# Patient Record
Sex: MALE | Race: BLACK OR AFRICAN AMERICAN | Employment: OTHER | URBAN - METROPOLITAN AREA
[De-identification: names, ages, dates, MRNs, and addresses within clinical notes are randomized per-mention and may not be internally consistent; named-entity substitution may affect disease eponyms.]

---

## 2019-10-25 ENCOUNTER — HOSPITAL ENCOUNTER (OUTPATIENT)
Dept: RADIATION THERAPY | Age: 64
Discharge: HOME | End: 2019-10-25

## 2020-01-03 ENCOUNTER — HOSPITAL ENCOUNTER (OUTPATIENT)
Dept: RADIATION THERAPY | Age: 65
Discharge: HOME | End: 2020-01-03

## 2020-02-14 ENCOUNTER — OFFICE VISIT (OUTPATIENT)
Dept: ONCOLOGY | Age: 65
End: 2020-02-14

## 2020-02-14 ENCOUNTER — DOCUMENTATION ONLY (OUTPATIENT)
Dept: ONCOLOGY | Age: 65
End: 2020-02-14

## 2020-02-14 VITALS
HEIGHT: 69 IN | DIASTOLIC BLOOD PRESSURE: 99 MMHG | HEART RATE: 108 BPM | WEIGHT: 191.2 LBS | BODY MASS INDEX: 28.32 KG/M2 | SYSTOLIC BLOOD PRESSURE: 147 MMHG | OXYGEN SATURATION: 97 % | TEMPERATURE: 98.2 F | RESPIRATION RATE: 18 BRPM

## 2020-02-14 DIAGNOSIS — C61 PRIMARY PROSTATE ADENOCARCINOMA (HCC): Primary | ICD-10-CM

## 2020-02-14 RX ORDER — METOPROLOL TARTRATE 25 MG/1
25 TABLET, FILM COATED ORAL 2 TIMES DAILY
COMMUNITY
End: 2020-10-06

## 2020-02-14 NOTE — PROGRESS NOTES
[facility] is a 62 y.o. male    Chief Complaint   Patient presents with    New Patient    Cancer     prostate cancer     1. Have you been to the ER, urgent care clinic since your last visit? Hospitalized since your last visit? No    2. Have you seen or consulted any other health care providers outside of the [address] [place] [address] since your last visit? Include any pap smears or colon screening.   Ana Maria Tesfaye in [facility] - Dr. Zenaida Szymanski; Dr. Bobbi Dejesus - prostate; Dr. Frank Dent - Dr. Bobbi Dejesus recommended Dr. Vivi Stewart - prostate    Dr. Frank Dent recommended that he come see Dr. Alana Monreal due to his prostate

## 2020-02-14 NOTE — PROGRESS NOTES
Oncology Navigator  Psychosocial Assessment    Reason for Assessment:    []Depression  []Anxiety  []Caregiver [place]  []Maladaptive Coping with Serious Illness   [] Social Work Referral [x] Initial Assessment  [] Other     Sources of Information:    [x]Patient  []Family  [x]Staff  [x]Medical Record    Advance Care Planning:  No flowsheet data found.     Mental Status:    [x]Alert  []Lethargic  []Unresponsive   [] Unable to assess   Oriented to:  [x]Person  [x]Place  [x]Time  [x]Situation      Barriers to Learning:    []Language  []Developmental  []Cognitive  []Altered Mental Status  []Visual/Hearing Impairment  []Unable to Read/Write  []Motivational   [x]No Barriers Identified  []Other:    Relationship Status:  []Single  []  []Significant Other/Life Partner  []  [x]  []      Living Circumstances:  [x]Lives Alone  []Family/Significant Other in Household  []Roommates  []Children in the Home  []Paid Caregivers  []Assisted Living Facility/Group Home  []Skilled [address]  []Homeless  []Incarcerated  []Environmental/Care Concerns  []other:    Employment Status:  []Employed Full-time []Employed Part-time []Homemaker [x] Disabled  [] Retired []Other:    Support System:    []Strong  []Fair  [x]Limited    Financial/Legal Concerns:    []Uninsured  [x]Limited Income/Resources  []Non-Citizen  []No Concerns Identified  []Financial POA:    []Other:    Baptism/Spiritual/Existential:  []Strong Sense of Spirituality  []Involved in [employer]  []Request  Visit  []Expressing Trung Alberto  [x]No Concerns Identified    Coping with Illness:         Patient: Family/Caregiver:   Understanding and Acceptance of Illness/Prognosis  [x] []   Strong Sense of Resilience [] []   Self Reflection [] []   Omar [] []   Does not Readily Discuss Illness [] []   Denial of Terminal Status [] []   Anger [] []   Depression [x] []   Anxiety/Fear [x] []   Bargaining [x] [] Recent Diagnosis/Prognosis [x] []   Difficulties with Body Image [] []   Loss of Identity [] []   Excessive Substance Use [] []   Mental Health History [] []   Enmeshed Relationships [] []   History of Loss [] []   Anticipatory Grief [] []   Concern for Complicated Grief [] []   Suicidal Ideation or Plan [] []   Unable to assess [] []            Narrative:   PT  for 7-8 years. PT has 4 children from 1st wife, 2 dtrs, 2 sons. One dtr lives in [place] and rest in [facility]. Pt has mortgage. Pt has social security $[id number]. Pt disease is curable. NEIL completed Medicaid application, called [facility] and scanned Medicaid appl for [facility] to submit to their on premise [address] services. NEIL provided pt with copy to take to  himself and a copy for when he goes to [facility]. Assessment/Action:     1. Introduce self and role of the  in the [facility]. 2. Informed the patient of the [facility] and available resources there. 3. Continue to meet with the patient when he returns to the clinic for ongoing assessment of the patient's adjustment to his  diagnosis and treatment. 4. Ongoing psychosocial support as desired by patient. Plan/Referral:      Complementary therapies referral  Insurance/Entitlements referral  No insurance, Possible [place] with a spend down    Financial/Medication assistance referral  Care card appl - pt will need to provide proof of income - Soc security letter. NEIL has in pending file      Baudilio Barroso.  ADALBERTO Chaney/TAYLER  Supervisee in Social Work

## 2020-02-23 NOTE — PROGRESS NOTES
[facility]  at [address] [place] [address], [address] [facility]  [phone]      Oncology Consultation Note         Patient: Tereza Mata MRN: [id number]  SSN: [id number]    YOB: 1955  Age: 62 y.o. Sex: male      Subjective:      Tereza Mata is a 62 y.o. male who I am seeing in consultation for a diagnosis of high risk localized prostate carcinoma. He was noted to have an elevated PSA of 215 in May 2019 and was seen by [facility]. A biopsy of the prostate revealed 1/12 core with [place] 4+3 and 11/12 cores with Alicia 3+4 prostate adenocarcinoma. He was seen by Dr. Luigi Busch and recommended to undergo EBRT with brachytherapy. He started taking Bicalutamide but has not been able to start Lupron due to cost and insurance issues. He was referred to Dr. Lilly Dumont who in turn sent the patient to me to start Lupron. He feels well and denies bone pains or weight loss. Imaging reveals no distant disease. Review of Systems:    Constitutional: negative  Eyes: negative  Ears, Nose, Mouth, Throat, and Face: negative  Respiratory: negative  Cardiovascular: negative  Gastrointestinal: negative  Genitourinary:negative  Integument/Breast: negative  Hematologic/Lymphatic: negative  Musculoskeletal:negative  Neurological: negative    History reviewed. No pertinent past medical history. History reviewed. No pertinent surgical history. Family History   Problem Relation Age of Onset    Heart Disease Brother         / had AICD     Social History     Tobacco Use    Smoking status: Current Every Day Smoker    Smokeless tobacco: Never Used    Tobacco comment:  day   Substance Use Topics    Alcohol use: Yes     Comment: beer/day - [place] Mist      Prior to Admission medications    Medication Sig Start Date End Date Taking?  Authorizing Provider   metoprolol tartrate (LOPRESSOR) 25 mg tablet Take 25 mg by mouth two (2) times a day. Yes Provider, Historical   aspirin 81 mg tablet Take 81 mg by mouth. Provider, Historical              No Known Allergies        Objective:     Vitals:    02/14/20 1459   BP: (!) 147/99   Pulse: (!) 108   Resp: 18   Temp: 98.2 °F (36.8 °C)   TempSrc: Oral   SpO2: 97%   Weight: 191 lb 3.2 oz (86.7 kg)   Height: 5' 9\" (1.753 m)            Physical Exam:    GENERAL: alert, cooperative, no distress, appears stated age  EYE: conjunctivae/corneas clear. PERRL, EOM's intact  LYMPHATIC: Cervical, supraclavicular, and axillary nodes normal.   THROAT & NECK: normal and no erythema or exudates noted. LUNG: clear to auscultation bilaterally  HEART: regular rate and rhythm, S1, S2 normal, no murmur, click, rub or gallop  ABDOMEN: soft, non-tender. Bowel sounds normal. No masses,  no organomegaly  EXTREMITIES:  extremities normal, atraumatic, no cyanosis or edema  SKIN: Normal.  NEUROLOGIC: AOx3. Gait normal. Reflexes and motor strength normal and symmetric. Cranial nerves 2-12 and sensation grossly intact. Assessment:     1. Prostate carcinoma:       Non-metastatic      Asymptomatic   [place] 4+3   Pre-    ECOG PS 0  Intent of therapy - curative  Prognosis: excelent    I had a long discussion about the pathophysiology of prostate carcinoma and the various treatment options. I spent 65 minutes in face-to-face encounter with the patient. Start Lupron. Plan:       1. Start ADT  2. Radiation therapy per Dr. Chantal Bernardo  3. Return in 6 months      Signed By: Jabari Sullivan MD     February 23, 2020         CC.  Juanita Hirsch MD

## 2020-04-03 ENCOUNTER — DOCUMENTATION ONLY (OUTPATIENT)
Dept: ONCOLOGY | Age: 65
End: 2020-04-03

## 2020-04-03 NOTE — PROGRESS NOTES
[employer]  Social Work Navigator Encounter     Patient Name:  Nessa Villa    Medical History:     Advance Directives:    Narrative:  Pt brought letter from  regarding Medicaid spenddown. $2760.62.  NEIL emailed and called [phone] regarding turning in bills. NEIL will try reach her next week.      Barriers to Care:     Assessment/Action:    Plan/Referral:     Insurance/Entitlements referral  Financial/Medication assistance referral

## 2020-04-17 ENCOUNTER — TELEPHONE (OUTPATIENT)
Dept: ONCOLOGY | Age: 65
End: 2020-04-17

## 2020-04-17 PROBLEM — C61 PROSTATE CANCER (HCC): Status: ACTIVE | Noted: 2020-04-17

## 2020-04-17 NOTE — TELEPHONE ENCOUNTER
Please give General Renee from [facility] regarding this pt, has questions about starting the Lupron Best contact [phone]

## 2020-04-17 NOTE — TELEPHONE ENCOUNTER
Pt has no insurance. Lupron was not ordered. Will order lupron now. Estella Marsh made aware and she will notify Catalina Argueta for assistance.  Will forward this to Allan Avery as well, she has communicated with [facility] SW regarding him in the past.

## 2020-04-21 ENCOUNTER — HOSPITAL ENCOUNTER (OUTPATIENT)
Dept: INFUSION THERAPY | Age: 65
Discharge: HOME | End: 2020-04-21
Payer: SELF-PAY

## 2020-04-21 VITALS
TEMPERATURE: 97.1 F | RESPIRATION RATE: 20 BRPM | SYSTOLIC BLOOD PRESSURE: 143 MMHG | HEART RATE: 106 BPM | DIASTOLIC BLOOD PRESSURE: 86 MMHG

## 2020-04-21 DIAGNOSIS — C61 PROSTATE CANCER (HCC): Primary | ICD-10-CM

## 2020-04-21 LAB
ALBUMIN SERPL-MCNC: 4.2 G/DL (ref 3.5–5)
ALBUMIN/GLOB SERPL: 1.1 {RATIO} (ref 1.1–2.2)
ALP SERPL-CCNC: 90 U/L (ref 45–117)
ALT SERPL-CCNC: 40 U/L (ref 12–78)
ANION GAP SERPL CALC-SCNC: 7 MMOL/L (ref 5–15)
AST SERPL-CCNC: 25 U/L (ref 15–37)
BASOPHILS # BLD: 0 K/UL (ref 0–0.1)
BASOPHILS NFR BLD: 1 % (ref 0–1)
BILIRUB SERPL-MCNC: 0.8 MG/DL (ref 0.2–1)
BUN SERPL-MCNC: 12 MG/DL (ref 6–20)
BUN/CREAT SERPL: 12 (ref 12–20)
CALCIUM SERPL-MCNC: 8.8 MG/DL (ref 8.5–10.1)
CHLORIDE SERPL-SCNC: 107 MMOL/L (ref 97–108)
CO2 SERPL-SCNC: 24 MMOL/L (ref 21–32)
CREAT SERPL-MCNC: 0.99 MG/DL (ref 0.7–1.3)
DIFFERENTIAL METHOD BLD: ABNORMAL
EOSINOPHIL # BLD: 0.1 K/UL (ref 0–0.4)
EOSINOPHIL NFR BLD: 2 % (ref 0–7)
ERYTHROCYTE [DISTWIDTH] IN BLOOD BY AUTOMATED COUNT: 14.8 % (ref 11.5–14.5)
GLOBULIN SER CALC-MCNC: 4 G/DL (ref 2–4)
GLUCOSE SERPL-MCNC: 124 MG/DL (ref 65–100)
HCT VFR BLD AUTO: 38.5 % (ref 36.6–50.3)
HGB BLD-MCNC: 12.8 G/DL (ref 12.1–17)
IMM GRANULOCYTES # BLD AUTO: 0 K/UL (ref 0–0.04)
IMM GRANULOCYTES NFR BLD AUTO: 0 % (ref 0–0.5)
LYMPHOCYTES # BLD: 1.5 K/UL (ref 0.8–3.5)
LYMPHOCYTES NFR BLD: 38 % (ref 12–49)
MCH RBC QN AUTO: 30.4 PG (ref 26–34)
MCHC RBC AUTO-ENTMCNC: 33.2 G/DL (ref 30–36.5)
MCV RBC AUTO: 91.4 FL (ref 80–99)
MONOCYTES # BLD: 0.6 K/UL (ref 0–1)
MONOCYTES NFR BLD: 14 % (ref 5–13)
NEUTS SEG # BLD: 1.9 K/UL (ref 1.8–8)
NEUTS SEG NFR BLD: 45 % (ref 32–75)
NRBC # BLD: 0 K/UL (ref 0–0.01)
NRBC BLD-RTO: 0 PER 100 WBC
PLATELET # BLD AUTO: 283 K/UL (ref 150–400)
PMV BLD AUTO: 10.1 FL (ref 8.9–12.9)
POTASSIUM SERPL-SCNC: 3.4 MMOL/L (ref 3.5–5.1)
PROT SERPL-MCNC: 8.2 G/DL (ref 6.4–8.2)
PSA SERPL-MCNC: 34.6 NG/ML (ref 0.01–4)
RBC # BLD AUTO: 4.21 M/UL (ref 4.1–5.7)
SODIUM SERPL-SCNC: 138 MMOL/L (ref 136–145)
WBC # BLD AUTO: 4.1 K/UL (ref 4.1–11.1)

## 2020-04-21 PROCEDURE — 84153 ASSAY OF PSA TOTAL: CPT

## 2020-04-21 PROCEDURE — 36415 COLL VENOUS BLD VENIPUNCTURE: CPT

## 2020-04-21 PROCEDURE — 80053 COMPREHEN METABOLIC PANEL: CPT

## 2020-04-21 PROCEDURE — 84402 ASSAY OF FREE TESTOSTERONE: CPT

## 2020-04-21 PROCEDURE — 85025 COMPLETE CBC W/AUTO DIFF WBC: CPT

## 2020-04-21 PROCEDURE — 74011250636 HC RX REV CODE- 250/636: Performed by: INTERNAL MEDICINE

## 2020-04-21 PROCEDURE — 96402 CHEMO HORMON ANTINEOPL SQ/IM: CPT

## 2020-04-21 RX ADMIN — LEUPROLIDE ACETATE 22.5 MG: KIT at 15:55

## 2020-04-21 NOTE — PROGRESS NOTES
[facility]patient [facility]fusion Center Progress Note    7561  Pt admit to [place] for Q3 month Lupron ambulatory in stable condition. Assessment completed. No acute concerns voiced, pt very anxious about today's treatment. Reassurance offered and medication information provided for and reviewed with pt. Pt also provided with Dr. Ying Patel office number in case any questions or concerns arise once he is home. Labs drawn peripherally as ordered, see [facility] for pending lab results. Visit Vitals  /86   Pulse (!) 106   Temp 97.1 °F (36.2 °C)   Resp 20       Medications:  Lupron IM in left gluteus della    1600  Pt tolerated treatment well. D/c home ambulatory in no distress.  Pt aware of next appointment scheduled for 7/14/20 at 3 pm.

## 2020-04-22 LAB — TESTOST FREE SERPL-MCNC: 7.4 PG/ML (ref 6.6–18.1)

## 2020-07-14 ENCOUNTER — HOSPITAL ENCOUNTER (OUTPATIENT)
Dept: INFUSION THERAPY | Age: 65
Discharge: HOME | End: 2020-07-14
Payer: SUBSIDIZED

## 2020-07-14 VITALS
BODY MASS INDEX: 29.83 KG/M2 | TEMPERATURE: 97.2 F | DIASTOLIC BLOOD PRESSURE: 100 MMHG | RESPIRATION RATE: 16 BRPM | SYSTOLIC BLOOD PRESSURE: 140 MMHG | WEIGHT: 202 LBS | HEART RATE: 86 BPM

## 2020-07-14 DIAGNOSIS — C61 PROSTATE CANCER (HCC): Primary | ICD-10-CM

## 2020-07-14 LAB
ALBUMIN SERPL-MCNC: 4.3 G/DL (ref 3.5–5)
ALBUMIN/GLOB SERPL: 1.1 {RATIO} (ref 1.1–2.2)
ALP SERPL-CCNC: 79 U/L (ref 45–117)
ALT SERPL-CCNC: 32 U/L (ref 12–78)
ANION GAP SERPL CALC-SCNC: 9 MMOL/L (ref 5–15)
AST SERPL-CCNC: 24 U/L (ref 15–37)
BASOPHILS # BLD: 0 K/UL (ref 0–0.1)
BASOPHILS NFR BLD: 1 % (ref 0–1)
BILIRUB SERPL-MCNC: 1.1 MG/DL (ref 0.2–1)
BUN SERPL-MCNC: 13 MG/DL (ref 6–20)
BUN/CREAT SERPL: 12 (ref 12–20)
CALCIUM SERPL-MCNC: 9.3 MG/DL (ref 8.5–10.1)
CHLORIDE SERPL-SCNC: 107 MMOL/L (ref 97–108)
CO2 SERPL-SCNC: 24 MMOL/L (ref 21–32)
CREAT SERPL-MCNC: 1.05 MG/DL (ref 0.7–1.3)
DIFFERENTIAL METHOD BLD: ABNORMAL
EOSINOPHIL # BLD: 0.1 K/UL (ref 0–0.4)
EOSINOPHIL NFR BLD: 2 % (ref 0–7)
ERYTHROCYTE [DISTWIDTH] IN BLOOD BY AUTOMATED COUNT: 14.7 % (ref 11.5–14.5)
GLOBULIN SER CALC-MCNC: 3.9 G/DL (ref 2–4)
GLUCOSE SERPL-MCNC: 110 MG/DL (ref 65–100)
HCT VFR BLD AUTO: 37.8 % (ref 36.6–50.3)
HGB BLD-MCNC: 12.5 G/DL (ref 12.1–17)
IMM GRANULOCYTES # BLD AUTO: 0 K/UL (ref 0–0.04)
IMM GRANULOCYTES NFR BLD AUTO: 0 % (ref 0–0.5)
LYMPHOCYTES # BLD: 1.3 K/UL (ref 0.8–3.5)
LYMPHOCYTES NFR BLD: 36 % (ref 12–49)
MCH RBC QN AUTO: 30.5 PG (ref 26–34)
MCHC RBC AUTO-ENTMCNC: 33.1 G/DL (ref 30–36.5)
MCV RBC AUTO: 92.2 FL (ref 80–99)
MONOCYTES # BLD: 0.5 K/UL (ref 0–1)
MONOCYTES NFR BLD: 15 % (ref 5–13)
NEUTS SEG # BLD: 1.6 K/UL (ref 1.8–8)
NEUTS SEG NFR BLD: 46 % (ref 32–75)
NRBC # BLD: 0 K/UL (ref 0–0.01)
NRBC BLD-RTO: 0 PER 100 WBC
PLATELET # BLD AUTO: 271 K/UL (ref 150–400)
PMV BLD AUTO: 10 FL (ref 8.9–12.9)
POTASSIUM SERPL-SCNC: 3.6 MMOL/L (ref 3.5–5.1)
PROT SERPL-MCNC: 8.2 G/DL (ref 6.4–8.2)
PSA SERPL-MCNC: 0.5 NG/ML (ref 0.01–4)
RBC # BLD AUTO: 4.1 M/UL (ref 4.1–5.7)
SODIUM SERPL-SCNC: 140 MMOL/L (ref 136–145)
WBC # BLD AUTO: 3.5 K/UL (ref 4.1–11.1)

## 2020-07-14 PROCEDURE — 96402 CHEMO HORMON ANTINEOPL SQ/IM: CPT

## 2020-07-14 PROCEDURE — 84403 ASSAY OF TOTAL TESTOSTERONE: CPT

## 2020-07-14 PROCEDURE — 80053 COMPREHEN METABOLIC PANEL: CPT

## 2020-07-14 PROCEDURE — 36415 COLL VENOUS BLD VENIPUNCTURE: CPT

## 2020-07-14 PROCEDURE — 84153 ASSAY OF PSA TOTAL: CPT

## 2020-07-14 PROCEDURE — 85025 COMPLETE CBC W/AUTO DIFF WBC: CPT

## 2020-07-14 PROCEDURE — 74011250636 HC RX REV CODE- 250/636: Performed by: INTERNAL MEDICINE

## 2020-07-14 RX ADMIN — LEUPROLIDE ACETATE 22.5 MG: KIT at 15:06

## 2020-07-14 NOTE — PROGRESS NOTES
[facility] VISIT NOTE    3079  Pt arrived at [facility] ambulatory and in no distress for Lupron. Assessment completed, pt c/o hot flashes. Blood pressure (!) 140/100, pulse 86, temperature 97.2 °F (36.2 °C), resp. rate 16, weight 91.6 kg (202 lb). Patient is aware that BP is elevated and will recheck at home. He is very nervous about receiving an injection and having blood drawn. Labs drawn peripherally. Medications received:  Lupron IM right GM    Tolerated treatment well, no adverse reaction noted. 1510  D/C'd from [facility] ambulatory and in no distress. Next appointment is 10/6/20 at 1500.

## 2020-07-15 LAB — TESTOST SERPL-MCNC: <3 NG/DL (ref 264–916)

## 2020-10-06 ENCOUNTER — OFFICE VISIT (OUTPATIENT)
Dept: ONCOLOGY | Age: 65
End: 2020-10-06
Payer: SUBSIDIZED

## 2020-10-06 ENCOUNTER — HOSPITAL ENCOUNTER (OUTPATIENT)
Dept: INFUSION THERAPY | Age: 65
Discharge: HOME | End: 2020-10-06
Payer: SUBSIDIZED

## 2020-10-06 VITALS
HEART RATE: 103 BPM | TEMPERATURE: 97 F | RESPIRATION RATE: 18 BRPM | DIASTOLIC BLOOD PRESSURE: 85 MMHG | SYSTOLIC BLOOD PRESSURE: 119 MMHG | OXYGEN SATURATION: 99 %

## 2020-10-06 VITALS
OXYGEN SATURATION: 96 % | HEIGHT: 69 IN | TEMPERATURE: 97.7 F | HEART RATE: 120 BPM | BODY MASS INDEX: 28.68 KG/M2 | DIASTOLIC BLOOD PRESSURE: 83 MMHG | WEIGHT: 193.6 LBS | SYSTOLIC BLOOD PRESSURE: 116 MMHG

## 2020-10-06 DIAGNOSIS — C61 PRIMARY PROSTATE ADENOCARCINOMA (HCC): Primary | ICD-10-CM

## 2020-10-06 DIAGNOSIS — C61 PROSTATE CANCER (HCC): Primary | ICD-10-CM

## 2020-10-06 LAB
ALBUMIN SERPL-MCNC: 4.2 G/DL (ref 3.5–5)
ALBUMIN/GLOB SERPL: 1.2 {RATIO} (ref 1.1–2.2)
ALP SERPL-CCNC: 74 U/L (ref 45–117)
ALT SERPL-CCNC: 41 U/L (ref 12–78)
ANION GAP SERPL CALC-SCNC: 4 MMOL/L (ref 5–15)
AST SERPL-CCNC: 36 U/L (ref 15–37)
BASOPHILS # BLD: 0 K/UL (ref 0–0.1)
BASOPHILS NFR BLD: 1 % (ref 0–1)
BILIRUB SERPL-MCNC: 0.6 MG/DL (ref 0.2–1)
BUN SERPL-MCNC: 8 MG/DL (ref 6–20)
BUN/CREAT SERPL: 9 (ref 12–20)
CALCIUM SERPL-MCNC: 9.5 MG/DL (ref 8.5–10.1)
CHLORIDE SERPL-SCNC: 107 MMOL/L (ref 97–108)
CO2 SERPL-SCNC: 29 MMOL/L (ref 21–32)
CREAT SERPL-MCNC: 0.94 MG/DL (ref 0.7–1.3)
DIFFERENTIAL METHOD BLD: ABNORMAL
EOSINOPHIL # BLD: 0 K/UL (ref 0–0.4)
EOSINOPHIL NFR BLD: 2 % (ref 0–7)
ERYTHROCYTE [DISTWIDTH] IN BLOOD BY AUTOMATED COUNT: 15.6 % (ref 11.5–14.5)
GLOBULIN SER CALC-MCNC: 3.6 G/DL (ref 2–4)
GLUCOSE SERPL-MCNC: 86 MG/DL (ref 65–100)
HCT VFR BLD AUTO: 37.2 % (ref 36.6–50.3)
HGB BLD-MCNC: 12 G/DL (ref 12.1–17)
IMM GRANULOCYTES # BLD AUTO: 0 K/UL (ref 0–0.04)
IMM GRANULOCYTES NFR BLD AUTO: 0 % (ref 0–0.5)
LYMPHOCYTES # BLD: 0.3 K/UL (ref 0.8–3.5)
LYMPHOCYTES NFR BLD: 17 % (ref 12–49)
MCH RBC QN AUTO: 30.9 PG (ref 26–34)
MCHC RBC AUTO-ENTMCNC: 32.3 G/DL (ref 30–36.5)
MCV RBC AUTO: 95.9 FL (ref 80–99)
MONOCYTES # BLD: 0.5 K/UL (ref 0–1)
MONOCYTES NFR BLD: 25 % (ref 5–13)
NEUTS SEG # BLD: 1.1 K/UL (ref 1.8–8)
NEUTS SEG NFR BLD: 55 % (ref 32–75)
NRBC # BLD: 0 K/UL (ref 0–0.01)
NRBC BLD-RTO: 0 PER 100 WBC
PLATELET # BLD AUTO: 257 K/UL (ref 150–400)
PMV BLD AUTO: 10.2 FL (ref 8.9–12.9)
POTASSIUM SERPL-SCNC: 3.5 MMOL/L (ref 3.5–5.1)
PROT SERPL-MCNC: 7.8 G/DL (ref 6.4–8.2)
PSA SERPL-MCNC: 0 NG/ML (ref 0.01–4)
RBC # BLD AUTO: 3.88 M/UL (ref 4.1–5.7)
RBC MORPH BLD: ABNORMAL
SODIUM SERPL-SCNC: 140 MMOL/L (ref 136–145)
WBC # BLD AUTO: 1.9 K/UL (ref 4.1–11.1)

## 2020-10-06 PROCEDURE — 74011250636 HC RX REV CODE- 250/636: Performed by: INTERNAL MEDICINE

## 2020-10-06 PROCEDURE — 84402 ASSAY OF FREE TESTOSTERONE: CPT

## 2020-10-06 PROCEDURE — 84153 ASSAY OF PSA TOTAL: CPT

## 2020-10-06 PROCEDURE — 36415 COLL VENOUS BLD VENIPUNCTURE: CPT

## 2020-10-06 PROCEDURE — 96402 CHEMO HORMON ANTINEOPL SQ/IM: CPT

## 2020-10-06 PROCEDURE — 99213 OFFICE O/P EST LOW 20 MIN: CPT | Performed by: INTERNAL MEDICINE

## 2020-10-06 PROCEDURE — 85025 COMPLETE CBC W/AUTO DIFF WBC: CPT

## 2020-10-06 PROCEDURE — 80053 COMPREHEN METABOLIC PANEL: CPT

## 2020-10-06 RX ORDER — TAMSULOSIN HYDROCHLORIDE 0.4 MG/1
0.4 CAPSULE ORAL DAILY
COMMUNITY
End: 2022-04-15

## 2020-10-06 RX ORDER — AMLODIPINE BESYLATE 10 MG/1
TABLET ORAL DAILY
COMMUNITY

## 2020-10-06 RX ADMIN — LEUPROLIDE ACETATE 22.5 MG: KIT SUBCUTANEOUS at 16:10

## 2020-10-06 NOTE — PROGRESS NOTES
Outpatient Infusion Center Progress Note    5421  Pt admit to [facility] for Q3 month Eligard (due to Lupron shortage) ambulatory in stable condition. Assessment completed. No acute concerns voiced, BP has improved. Labs drawn peripherally as ordered. Visit Vitals  /85   Pulse (!) 103   Temp 97 °F (36.1 °C)   Resp 18   SpO2 99%        Medications:  Eligard SQ in left abdomen    1615  Pt tolerated treatment well. D/c home ambulatory in no distress. Pt aware of next appointment scheduled for 12/29/20 at 3 pm.    See [facility] for pending lab results.

## 2020-10-06 NOTE — PROGRESS NOTES
[facility]  at [facility] [address] [place], [address] [facility]  [phone]      Oncology Progress Note         Patient: Leonardo Rao MRN: [id number]  SSN: [id number]    YOB: 1955  Age: 60 y.o. Sex: male      Subjective:      Leonardo Rao is a 60 y.o. male who I am seeing in consultation for a diagnosis of high risk localized prostate carcinoma. He was noted to have an elevated PSA of 215 in May 2019 and was seen by [facility]. A biopsy of the prostate revealed 1/12 core with [place] 4+3 and 11/12 cores with [place] 3+4 prostate adenocarcinoma. He was referred to Dr. Livier Vera who in turn sent the patient to me to start Lupron. He feels well and denies bone pains or weight loss. Imaging reveals no distant disease. He is receiving EBRT to the prostate and is on ADT. Review of Systems:    Constitutional: negative  Eyes: negative  Ears, Nose, Mouth, Throat, and Face: negative  Respiratory: negative  Cardiovascular: negative  Gastrointestinal: negative  Genitourinary:negative  Integument/Breast: negative  Hematologic/Lymphatic: negative  Musculoskeletal:negative  Neurological: negative      No past medical history on file. No past surgical history on file. Family History   Problem Relation Age of Onset    Heart Disease Brother         / had AICD     Social History     Tobacco Use    Smoking status: Current Every Day Smoker    Smokeless tobacco: Never Used    Tobacco comment: / day   Substance Use Topics    Alcohol use: Yes     Comment: beer/day - [place] Mist      Prior to Admission medications    Medication Sig Start Date End Date Taking? Authorizing Provider   amLODIPine (NORVASC) 10 mg tablet Take  by mouth daily. Yes Provider, Historical   tamsulosin (FLOMAX) 0.4 mg capsule Take 0.4 mg by mouth daily.    Yes Provider, Historical   metoprolol tartrate (LOPRESSOR) 25 mg tablet Take 25 mg by mouth two (2) times a day. Provider, Historical   aspirin 81 mg tablet Take 81 mg by mouth. Provider, Historical              No Known Allergies        Objective:     Vitals:    10/06/20 1443   BP: 116/83   Pulse: (!) 120   Temp: 97.7 °F (36.5 °C)   SpO2: 96%   Weight: 193 lb 9.6 oz (87.8 kg)   Height: 5' 9\" (1.753 m)        Physical Exam:    GENERAL: alert, cooperative, no distress, appears stated age  EYE: conjunctivae/corneas clear. PERRL, EOM's intact  LYMPHATIC: Cervical, supraclavicular, and axillary nodes normal.   THROAT & NECK: normal and no erythema or exudates noted. LUNG: clear to auscultation bilaterally  HEART: regular rate and rhythm, S1, S2 normal, no murmur, click, rub or gallop  ABDOMEN: soft, non-tender. Bowel sounds normal. No masses,  no organomegaly  EXTREMITIES:  extremities normal, atraumatic, no cyanosis or edema  SKIN: Normal.  NEUROLOGIC: AOx3. Gait normal. Reflexes and motor strength normal and symmetric. Cranial nerves 2-12 and sensation grossly intact. Assessment:     1. Prostate carcinoma:       Non-metastatic      Asymptomatic   Alicia 4+3   Pre-    ECOG PS 0  Intent of therapy - curative  Prognosis: excelent    Receiving Lupron. Receiving EBRT  Doing well  Asymptomatic      Plan:       1. ADT  2. Complete radiation  3. Return in 6 months      Signed by: Enrique Escalera MD                     October 6, 2020        CC.  Donald Miranda MD

## 2020-10-06 NOTE — PROGRESS NOTES
Paulo Johnson is a 60 y.o. male here for follow up for:  Chief Complaint   Patient presents with    Prostate Cancer   *getting Lupron injection today    1. Have you been to the ER, urgent care clinic since your last visit? Hospitalized since your last visit? no    2. Have you seen or consulted any other health care providers outside of the [address] [place] [address] since your last visit? Include any pap smears or colon screening. [facility]     Pt states everything has been fine since last visit. No complaints. [facility] sent him somewhere to check his BP and get meds straight. Not sure of name. He states this is his last week at [facility].

## 2020-10-09 LAB — TESTOST FREE SERPL-MCNC: 0.6 PG/ML (ref 6.6–18.1)

## 2020-12-29 ENCOUNTER — HOSPITAL ENCOUNTER (OUTPATIENT)
Dept: INFUSION THERAPY | Age: 65
Discharge: HOME | End: 2020-12-29
Payer: SUBSIDIZED

## 2020-12-29 VITALS
TEMPERATURE: 97.3 F | WEIGHT: 207.5 LBS | RESPIRATION RATE: 18 BRPM | SYSTOLIC BLOOD PRESSURE: 146 MMHG | OXYGEN SATURATION: 98 % | BODY MASS INDEX: 30.64 KG/M2 | HEART RATE: 101 BPM | DIASTOLIC BLOOD PRESSURE: 95 MMHG

## 2020-12-29 DIAGNOSIS — C61 PROSTATE CANCER (HCC): Primary | ICD-10-CM

## 2020-12-29 LAB
ALBUMIN SERPL-MCNC: 4.2 G/DL (ref 3.5–5)
ALBUMIN/GLOB SERPL: 1.1 {RATIO} (ref 1.1–2.2)
ALP SERPL-CCNC: 132 U/L (ref 45–117)
ALT SERPL-CCNC: 84 U/L (ref 12–78)
ANION GAP SERPL CALC-SCNC: 6 MMOL/L (ref 5–15)
AST SERPL-CCNC: 48 U/L (ref 15–37)
BASOPHILS # BLD: 0 K/UL (ref 0–0.1)
BASOPHILS NFR BLD: 1 % (ref 0–1)
BILIRUB SERPL-MCNC: 0.4 MG/DL (ref 0.2–1)
BUN SERPL-MCNC: 12 MG/DL (ref 6–20)
BUN/CREAT SERPL: 14 (ref 12–20)
CALCIUM SERPL-MCNC: 9.3 MG/DL (ref 8.5–10.1)
CHLORIDE SERPL-SCNC: 107 MMOL/L (ref 97–108)
CO2 SERPL-SCNC: 24 MMOL/L (ref 21–32)
CREAT SERPL-MCNC: 0.85 MG/DL (ref 0.7–1.3)
DIFFERENTIAL METHOD BLD: ABNORMAL
EOSINOPHIL # BLD: 0.1 K/UL (ref 0–0.4)
EOSINOPHIL NFR BLD: 2 % (ref 0–7)
ERYTHROCYTE [DISTWIDTH] IN BLOOD BY AUTOMATED COUNT: 14.6 % (ref 11.5–14.5)
GLOBULIN SER CALC-MCNC: 3.7 G/DL (ref 2–4)
GLUCOSE SERPL-MCNC: 87 MG/DL (ref 65–100)
HCT VFR BLD AUTO: 35.6 % (ref 36.6–50.3)
HGB BLD-MCNC: 11.8 G/DL (ref 12.1–17)
IMM GRANULOCYTES # BLD AUTO: 0 K/UL (ref 0–0.04)
IMM GRANULOCYTES NFR BLD AUTO: 0 % (ref 0–0.5)
LYMPHOCYTES # BLD: 0.6 K/UL (ref 0.8–3.5)
LYMPHOCYTES NFR BLD: 20 % (ref 12–49)
MCH RBC QN AUTO: 31.1 PG (ref 26–34)
MCHC RBC AUTO-ENTMCNC: 33.1 G/DL (ref 30–36.5)
MCV RBC AUTO: 93.7 FL (ref 80–99)
MONOCYTES # BLD: 0.5 K/UL (ref 0–1)
MONOCYTES NFR BLD: 17 % (ref 5–13)
NEUTS SEG # BLD: 2 K/UL (ref 1.8–8)
NEUTS SEG NFR BLD: 60 % (ref 32–75)
NRBC # BLD: 0 K/UL (ref 0–0.01)
NRBC BLD-RTO: 0 PER 100 WBC
PLATELET # BLD AUTO: 270 K/UL (ref 150–400)
PMV BLD AUTO: 10 FL (ref 8.9–12.9)
POTASSIUM SERPL-SCNC: 3.6 MMOL/L (ref 3.5–5.1)
PROT SERPL-MCNC: 7.9 G/DL (ref 6.4–8.2)
PSA SERPL-MCNC: 0 NG/ML (ref 0.01–4)
RBC # BLD AUTO: 3.8 M/UL (ref 4.1–5.7)
RBC MORPH BLD: ABNORMAL
RBC MORPH BLD: ABNORMAL
SODIUM SERPL-SCNC: 137 MMOL/L (ref 136–145)
WBC # BLD AUTO: 3.2 K/UL (ref 4.1–11.1)

## 2020-12-29 PROCEDURE — 36415 COLL VENOUS BLD VENIPUNCTURE: CPT

## 2020-12-29 PROCEDURE — 84153 ASSAY OF PSA TOTAL: CPT

## 2020-12-29 PROCEDURE — 96402 CHEMO HORMON ANTINEOPL SQ/IM: CPT

## 2020-12-29 PROCEDURE — 84403 ASSAY OF TOTAL TESTOSTERONE: CPT

## 2020-12-29 PROCEDURE — 85025 COMPLETE CBC W/AUTO DIFF WBC: CPT

## 2020-12-29 PROCEDURE — 80053 COMPREHEN METABOLIC PANEL: CPT

## 2020-12-29 PROCEDURE — 74011250636 HC RX REV CODE- 250/636: Performed by: INTERNAL MEDICINE

## 2020-12-29 RX ADMIN — LEUPROLIDE ACETATE 22.5 MG: KIT SUBCUTANEOUS at 15:11

## 2020-12-29 NOTE — PROGRESS NOTES
[facility] Visit Note    1500 Pt arrived at [facility] ambulatory and in no distress for Eligard. Assessment completed, no new complaints voiced. Labs drawn including CBC with diff, CMP, PSA, and Testosterone. Patient denied having any symptoms of COVID-19, i.e. SOB, coughing, fever, or generally not feeling well. Also denies having been exposed to COVID-19 recently or having had any recent contact with family/friend that has a pending COVID test.    Visit Vitals  BP (!) 146/95 (BP 1 Location: Left arm, BP Patient Position: Sitting)   Pulse (!) 101   Temp 97.3 °F (36.3 °C)   Resp 18   Wt 94.1 kg (207 lb 8 oz)   SpO2 98%   BMI 30.64 kg/m²     See [facility] for pending labs. Medications received:  Eligard 22.5 mg SQ in right abdomen    1515 Tolerated treatment well, no adverse reaction noted. D/Cd from [facility] ambulatory and in no distress accompanied by self. Next appt 3/23/21 @ 1500.

## 2020-12-30 LAB — TESTOST SERPL-MCNC: <3 NG/DL (ref 264–916)

## 2021-03-23 ENCOUNTER — HOSPITAL ENCOUNTER (OUTPATIENT)
Dept: INFUSION THERAPY | Age: 66
Discharge: HOME | End: 2021-03-23

## 2021-03-23 VITALS
OXYGEN SATURATION: 100 % | BODY MASS INDEX: 30.94 KG/M2 | TEMPERATURE: 97.1 F | HEART RATE: 95 BPM | DIASTOLIC BLOOD PRESSURE: 95 MMHG | SYSTOLIC BLOOD PRESSURE: 139 MMHG | RESPIRATION RATE: 18 BRPM | WEIGHT: 209.5 LBS

## 2021-03-23 DIAGNOSIS — C61 PROSTATE CANCER (HCC): Primary | ICD-10-CM

## 2021-03-23 LAB
ALBUMIN SERPL-MCNC: 4 G/DL (ref 3.5–5)
ALBUMIN/GLOB SERPL: 1.1 {RATIO} (ref 1.1–2.2)
ALP SERPL-CCNC: 107 U/L (ref 45–117)
ALT SERPL-CCNC: 51 U/L (ref 12–78)
ANION GAP SERPL CALC-SCNC: 6 MMOL/L (ref 5–15)
AST SERPL-CCNC: 38 U/L (ref 15–37)
BASOPHILS # BLD: 0 K/UL (ref 0–0.1)
BASOPHILS NFR BLD: 1 % (ref 0–1)
BILIRUB SERPL-MCNC: 0.6 MG/DL (ref 0.2–1)
BUN SERPL-MCNC: 12 MG/DL (ref 6–20)
BUN/CREAT SERPL: 13 (ref 12–20)
CALCIUM SERPL-MCNC: 9.1 MG/DL (ref 8.5–10.1)
CHLORIDE SERPL-SCNC: 107 MMOL/L (ref 97–108)
CO2 SERPL-SCNC: 25 MMOL/L (ref 21–32)
CREAT SERPL-MCNC: 0.94 MG/DL (ref 0.7–1.3)
DIFFERENTIAL METHOD BLD: ABNORMAL
EOSINOPHIL # BLD: 0 K/UL (ref 0–0.4)
EOSINOPHIL NFR BLD: 1 % (ref 0–7)
ERYTHROCYTE [DISTWIDTH] IN BLOOD BY AUTOMATED COUNT: 13.7 % (ref 11.5–14.5)
GLOBULIN SER CALC-MCNC: 3.8 G/DL (ref 2–4)
GLUCOSE SERPL-MCNC: 100 MG/DL (ref 65–100)
HCT VFR BLD AUTO: 37 % (ref 36.6–50.3)
HGB BLD-MCNC: 12.2 G/DL (ref 12.1–17)
IMM GRANULOCYTES # BLD AUTO: 0 K/UL (ref 0–0.04)
IMM GRANULOCYTES NFR BLD AUTO: 0 % (ref 0–0.5)
LYMPHOCYTES # BLD: 0.6 K/UL (ref 0.8–3.5)
LYMPHOCYTES NFR BLD: 20 % (ref 12–49)
MCH RBC QN AUTO: 30.4 PG (ref 26–34)
MCHC RBC AUTO-ENTMCNC: 33 G/DL (ref 30–36.5)
MCV RBC AUTO: 92.3 FL (ref 80–99)
MONOCYTES # BLD: 0.5 K/UL (ref 0–1)
MONOCYTES NFR BLD: 17 % (ref 5–13)
NEUTS SEG # BLD: 2.1 K/UL (ref 1.8–8)
NEUTS SEG NFR BLD: 61 % (ref 32–75)
NRBC # BLD: 0 K/UL (ref 0–0.01)
NRBC BLD-RTO: 0 PER 100 WBC
PLATELET # BLD AUTO: 260 K/UL (ref 150–400)
PMV BLD AUTO: 9.9 FL (ref 8.9–12.9)
POTASSIUM SERPL-SCNC: 3.7 MMOL/L (ref 3.5–5.1)
PROT SERPL-MCNC: 7.8 G/DL (ref 6.4–8.2)
RBC # BLD AUTO: 4.01 M/UL (ref 4.1–5.7)
RBC MORPH BLD: ABNORMAL
SODIUM SERPL-SCNC: 138 MMOL/L (ref 136–145)
WBC # BLD AUTO: 3.2 K/UL (ref 4.1–11.1)

## 2021-03-23 PROCEDURE — 85025 COMPLETE CBC W/AUTO DIFF WBC: CPT

## 2021-03-23 PROCEDURE — 36415 COLL VENOUS BLD VENIPUNCTURE: CPT

## 2021-03-23 PROCEDURE — 80053 COMPREHEN METABOLIC PANEL: CPT

## 2021-03-23 PROCEDURE — 84403 ASSAY OF TOTAL TESTOSTERONE: CPT

## 2021-03-23 PROCEDURE — 74011250636 HC RX REV CODE- 250/636: Performed by: INTERNAL MEDICINE

## 2021-03-23 PROCEDURE — 96402 CHEMO HORMON ANTINEOPL SQ/IM: CPT

## 2021-03-23 PROCEDURE — 84153 ASSAY OF PSA TOTAL: CPT

## 2021-03-23 RX ADMIN — LEUPROLIDE ACETATE 22.5 MG: KIT SUBCUTANEOUS at 15:10

## 2021-03-23 NOTE — PROGRESS NOTES
Pt arrived to [facility] ambulatory in no acute distress at 1500 for Eligard.  Assessment unremarkable except anxiety. Labs obtained, CBC, CMP, PSA, Testosterone. Patient denied having any symptoms of COVID-19, i.e. SOB, coughing, fever, or generally not feeling well. Also denies having been exposed to COVID-19 recently or having had any recent contact with family/friend that has a pending COVID test.    Visit Vitals  BP (!) 139/95 (BP 1 Location: Left upper arm, BP Patient Position: Sitting)   Pulse 95   Temp 97.1 °F (36.2 °C)   Resp 18   Wt 95 kg (209 lb 8 oz)   SpO2 100%   BMI 30.94 kg/m²     See [facility] for pending results. The following medications administered:  Medications Administered     leuprolide (ELIGARD 3 MONTH) injection 22.5 mg     Admin Date  03/23/2021 Action  Given Dose  22.5 mg Route  SubCUTAneous Administered By  Brett Milton RN              Given on L side of abdomen    Pt tolerated treatment well. Pt discharged ambulatory in no acute distress at 1515, accompanied by self. Next appointment 6/15/21 at 1500.

## 2021-03-24 LAB
COMMENT, TESC2: ABNORMAL
PSA SERPL-MCNC: 0 NG/ML (ref 0.01–4)
TESTOST SERPL-MCNC: <3 NG/DL (ref 264–916)

## 2021-06-03 NOTE — PROGRESS NOTES
Geri Mcbride is a 60 y.o. male here for 6 month follow up for prostate cancer. 1. Have you been to the ER, urgent care clinic since your last visit? Hospitalized since your last visit? no    2. Have you seen or consulted any other health care providers outside of the [address] [place] [address] since your last visit? Include any pap smears or colon screening. [facility] doctor that did his radiation    Pt states he has been doing well. No complaints.

## 2021-06-04 ENCOUNTER — OFFICE VISIT (OUTPATIENT)
Dept: ONCOLOGY | Age: 66
End: 2021-06-04

## 2021-06-04 VITALS
DIASTOLIC BLOOD PRESSURE: 85 MMHG | BODY MASS INDEX: 29.8 KG/M2 | OXYGEN SATURATION: 99 % | TEMPERATURE: 98.4 F | SYSTOLIC BLOOD PRESSURE: 128 MMHG | HEART RATE: 85 BPM | HEIGHT: 69 IN | WEIGHT: 201.2 LBS

## 2021-06-04 DIAGNOSIS — C61 PRIMARY PROSTATE ADENOCARCINOMA (HCC): Primary | ICD-10-CM

## 2021-06-04 PROCEDURE — 99214 OFFICE O/P EST MOD 30 MIN: CPT | Performed by: INTERNAL MEDICINE

## 2021-06-05 NOTE — PROGRESS NOTES
[facility] [address], [address] [facility], [address] [place] [address]  [phone]      Oncology Progress Note         Patient: Arnol Guevara MRN: [id number]  SSN: [id number]    YOB: 1955  Age: 60 y.o. Sex: male      Subjective:      Arnol Guevara is a 60 y.o. male who I am seeing in consultation for a diagnosis of high risk localized prostate carcinoma. He was noted to have an elevated PSA of 215 in May 2019 and was seen by [facility]. A biopsy of the prostate revealed 1/12 core with Alicia 4+3 and 11/12 cores with [place] 3+4 prostate adenocarcinoma. He was referred to Dr. Ray Mcgee who in turn sent the patient to me to start Lupron. He feels well and denies bone pains or weight loss. He has completed EBRT to the prostate and is on ADT. Review of Systems:    Constitutional: negative  Eyes: negative  Ears, Nose, Mouth, Throat, and Face: negative  Respiratory: negative  Cardiovascular: negative  Gastrointestinal: negative  Genitourinary:negative  Integument/Breast: negative  Hematologic/Lymphatic: negative  Musculoskeletal:negative  Neurological: negative      No past medical history on file. No past surgical history on file. Family History   Problem Relation Age of Onset    Heart Disease Brother         / had AICD     Social History     Tobacco Use    Smoking status: Current Every Day Smoker    Smokeless tobacco: Never Used    Tobacco comment: / day   Substance Use Topics    Alcohol use: Yes     Comment: beer/day - [place] Mist      Prior to Admission medications    Medication Sig Start Date End Date Taking? Authorizing Provider   amLODIPine (NORVASC) 10 mg tablet Take  by mouth daily. Yes Provider, Historical   tamsulosin (FLOMAX) 0.4 mg capsule Take 0.4 mg by mouth daily.    Yes Provider, Historical              No Known Allergies        Objective:     Vitals:    21 1119 BP: 128/85   Pulse: 85   Temp: 98.4 °F (36.9 °C)   TempSrc: Temporal   SpO2: 99%   Weight: 201 lb 3.2 oz (91.3 kg)   Height: 5' 9\" (1.753 m)        Physical Exam:    GENERAL: alert, cooperative, no distress, appears stated age  EYE: conjunctivae/corneas clear. PERRL, EOM's intact  LYMPHATIC: Cervical, supraclavicular, and axillary nodes normal.   THROAT & NECK: normal and no erythema or exudates noted. LUNG: clear to auscultation bilaterally  HEART: regular rate and rhythm, S1, S2 normal, no murmur, click, rub or gallop  ABDOMEN: soft, non-tender. Bowel sounds normal. No masses,  no organomegaly  EXTREMITIES:  extremities normal, atraumatic, no cyanosis or edema  SKIN: Normal.  NEUROLOGIC: AOx3. Gait normal. Reflexes and motor strength normal and symmetric. Cranial nerves 2-12 and sensation grossly intact. Physical exam and ROS has been modified from a prior visit to make it relevant and current      Assessment:     1. Prostate carcinoma:       Non-metastatic      Asymptomatic   [place] 4+3   Pre-    ECOG PS 0  Intent of therapy - curative  Prognosis: excelent    Receiving Lupron. Received EBRT  PSA remains at 0  Doing well  Asymptomatic      Plan:       1. Continue ADT  2. Return in 6 months      Signed by: Marianne Brito MD                     June 5, 2021        CC.  Benny Magdaleno MD

## 2021-06-15 ENCOUNTER — HOSPITAL ENCOUNTER (OUTPATIENT)
Dept: INFUSION THERAPY | Age: 66
Discharge: HOME | End: 2021-06-15

## 2021-06-15 VITALS
HEART RATE: 97 BPM | RESPIRATION RATE: 16 BRPM | DIASTOLIC BLOOD PRESSURE: 88 MMHG | OXYGEN SATURATION: 98 % | SYSTOLIC BLOOD PRESSURE: 127 MMHG | BODY MASS INDEX: 29.14 KG/M2 | WEIGHT: 197.3 LBS | TEMPERATURE: 97.8 F

## 2021-06-15 DIAGNOSIS — C61 PROSTATE CANCER (HCC): Primary | ICD-10-CM

## 2021-06-15 LAB
ALBUMIN SERPL-MCNC: 4.1 G/DL (ref 3.5–5)
ALBUMIN/GLOB SERPL: 1 {RATIO} (ref 1.1–2.2)
ALP SERPL-CCNC: 116 U/L (ref 45–117)
ALT SERPL-CCNC: 51 U/L (ref 12–78)
ANION GAP SERPL CALC-SCNC: 6 MMOL/L (ref 5–15)
AST SERPL-CCNC: 45 U/L (ref 15–37)
BASOPHILS # BLD: 0 K/UL (ref 0–0.1)
BASOPHILS NFR BLD: 1 % (ref 0–1)
BILIRUB SERPL-MCNC: 0.6 MG/DL (ref 0.2–1)
BUN SERPL-MCNC: 16 MG/DL (ref 6–20)
BUN/CREAT SERPL: 17 (ref 12–20)
CALCIUM SERPL-MCNC: 9.2 MG/DL (ref 8.5–10.1)
CHLORIDE SERPL-SCNC: 108 MMOL/L (ref 97–108)
CO2 SERPL-SCNC: 25 MMOL/L (ref 21–32)
CREAT SERPL-MCNC: 0.92 MG/DL (ref 0.7–1.3)
DIFFERENTIAL METHOD BLD: ABNORMAL
EOSINOPHIL # BLD: 0 K/UL (ref 0–0.4)
EOSINOPHIL NFR BLD: 1 % (ref 0–7)
ERYTHROCYTE [DISTWIDTH] IN BLOOD BY AUTOMATED COUNT: 14.8 % (ref 11.5–14.5)
GLOBULIN SER CALC-MCNC: 4.1 G/DL (ref 2–4)
GLUCOSE SERPL-MCNC: 96 MG/DL (ref 65–100)
HCT VFR BLD AUTO: 37.6 % (ref 36.6–50.3)
HGB BLD-MCNC: 12.3 G/DL (ref 12.1–17)
IMM GRANULOCYTES # BLD AUTO: 0 K/UL (ref 0–0.04)
IMM GRANULOCYTES NFR BLD AUTO: 0 % (ref 0–0.5)
LYMPHOCYTES # BLD: 0.6 K/UL (ref 0.8–3.5)
LYMPHOCYTES NFR BLD: 23 % (ref 12–49)
MCH RBC QN AUTO: 30.1 PG (ref 26–34)
MCHC RBC AUTO-ENTMCNC: 32.7 G/DL (ref 30–36.5)
MCV RBC AUTO: 92.2 FL (ref 80–99)
MONOCYTES # BLD: 0.4 K/UL (ref 0–1)
MONOCYTES NFR BLD: 17 % (ref 5–13)
NEUTS SEG # BLD: 1.5 K/UL (ref 1.8–8)
NEUTS SEG NFR BLD: 58 % (ref 32–75)
NRBC # BLD: 0 K/UL (ref 0–0.01)
NRBC BLD-RTO: 0 PER 100 WBC
PLATELET # BLD AUTO: 272 K/UL (ref 150–400)
PMV BLD AUTO: 9.8 FL (ref 8.9–12.9)
POTASSIUM SERPL-SCNC: 3.9 MMOL/L (ref 3.5–5.1)
PROT SERPL-MCNC: 8.2 G/DL (ref 6.4–8.2)
PSA SERPL-MCNC: 0 NG/ML (ref 0.01–4)
RBC # BLD AUTO: 4.08 M/UL (ref 4.1–5.7)
RBC MORPH BLD: ABNORMAL
SODIUM SERPL-SCNC: 139 MMOL/L (ref 136–145)
WBC # BLD AUTO: 2.5 K/UL (ref 4.1–11.1)

## 2021-06-15 PROCEDURE — 36415 COLL VENOUS BLD VENIPUNCTURE: CPT

## 2021-06-15 PROCEDURE — 74011250636 HC RX REV CODE- 250/636: Performed by: INTERNAL MEDICINE

## 2021-06-15 PROCEDURE — 96402 CHEMO HORMON ANTINEOPL SQ/IM: CPT

## 2021-06-15 PROCEDURE — 80053 COMPREHEN METABOLIC PANEL: CPT

## 2021-06-15 PROCEDURE — 84153 ASSAY OF PSA TOTAL: CPT

## 2021-06-15 PROCEDURE — 84403 ASSAY OF TOTAL TESTOSTERONE: CPT

## 2021-06-15 PROCEDURE — 85025 COMPLETE CBC W/AUTO DIFF WBC: CPT

## 2021-06-15 RX ADMIN — LEUPROLIDE ACETATE 22.5 MG: KIT SUBCUTANEOUS at 15:20

## 2021-06-15 NOTE — PROGRESS NOTES
[facility] Visit Note    1500- Pt arrived at [facility] ambulatory and in no distress for Eligard. Assessment completed, no new complaints voiced. Patient denied having any symptoms of COVID-19, i.e. SOB, coughing, fever, or generally not feeling well. Also denies having been exposed to COVID-19 recently or having had any recent contact with family/friend that has a pending COVID test.    Labs Obtained - CBC w/ diff, CMP, PSA, Testosterone    Visit Vitals  /88   Pulse 97   Temp 97.8 °F (36.6 °C)   Resp 16   Wt 89.5 kg (197 lb 4.8 oz)   SpO2 98%   BMI 29.14 kg/m²       Medications received:  Eligard 22.5 mg SQ to right side abd    1525- Tolerated treatment well, no adverse reaction noted. D/Cd from [facility] ambulatory and in no distress accompanied by self.   Next appt 9/7/21

## 2021-06-17 ENCOUNTER — TELEPHONE (OUTPATIENT)
Dept: ONCOLOGY | Age: 66
End: 2021-06-17

## 2021-06-17 ENCOUNTER — DOCUMENTATION ONLY (OUTPATIENT)
Dept: ONCOLOGY | Age: 66
End: 2021-06-17

## 2021-06-17 LAB
COMMENT, TESC2: ABNORMAL
TESTOST SERPL-MCNC: <3 NG/DL (ref 264–916)

## 2021-06-17 NOTE — TELEPHONE ENCOUNTER
[address]  Social Work Navigator Encounter     Patient Name:  Clement Sumner    Medical History:     Advance Directives:    Narrative: SW spoke to someone he believes at [employer] who states they were going to help him. SW asked if pt had called [employer] to apply for Medicaid. Pt had not. SW asked him to call today and to tell them to make me an authorized representative for him.       Barriers to Care:     Plan:    Referral:

## 2021-06-18 ENCOUNTER — TELEPHONE (OUTPATIENT)
Dept: ONCOLOGY | Age: 66
End: 2021-06-18

## 2021-06-18 NOTE — TELEPHONE ENCOUNTER
[employer]  Social Work Navigator Encounter     Patient Name:  Cady Barrett    Medical History:     Advance Directives:    Narrative: pt income is $2780, pt to bring proof on Monday around 11:00am.   For FAP and Medicaid application info.       Barriers to Care:     Plan:    Referral:   Insurance/Entitlements referral  Financial/Medication assistance referral

## 2021-06-18 NOTE — PROGRESS NOTES
[address]  Social Work Navigator Encounter     Patient Name:  Jaycob King    Medical History: dx prostate cancer    Advance Directives:    Narrative: SW placed a 3 way call with pt and Cover [employer] to see status of Medicaid application from [facility]. Cover [employer] person directed pt and SW to contact his  Mag Hoskins at [phone]. SW left a msg which stated they would call back in 3-5 days. NEIL also completed the Medicaid application on 8/46 and obtained hospital billing to fax to [phone]. NEIL provided Financial Application to [employer] to follow up on reapplying. NEIL also reached out to [facility] to have them help pt whose bills have been sent to collections. NEIL also scanned what was faxed to Email for record keeping and to provide to [facility] if needed. Barriers to Care:     Plan:    Referral:     Insurance/Entitlements referral  Medicaid ?  Not sure of pt new income (from 2020- $1409, 2021 - ? )    Financial/Medication assistance referral

## 2021-06-22 ENCOUNTER — DOCUMENTATION ONLY (OUTPATIENT)
Dept: ONCOLOGY | Age: 66
End: 2021-06-22

## 2021-06-22 NOTE — PROGRESS NOTES
[address]  Social Work Navigator Encounter     Patient Name:  Teddy Delgadillo     Medical History: dx prostate cancer    Advance Directives:    Narrative: pt elected early Social security and began receiving in Dec 2018. Pt will be eligible for Medicare in 09/01/21 - year/month of his 66 bday. Per Medicare lady on 6/22/21 with pt on phone - pt qualifies for Extra Help and QMB. Pt will need to sign up for a plan to be effective 9/1/2021.         Barriers to Care:     Plan:    Referral:   Complementary therapies referral  Insurance/Entitlements referral  Financial/Medication assistance referral Full range of motion of upper and lower extremities, no joint tenderness/swelling.

## 2021-06-29 ENCOUNTER — DOCUMENTATION ONLY (OUTPATIENT)
Dept: ONCOLOGY | Age: 66
End: 2021-06-29

## 2021-06-29 NOTE — PROGRESS NOTES
[address]  Social Work Navigator Encounter     Patient Name:  Markie Brito    Medical History: dx prostate cancer    Advance Directives: pt completed forms for  [employer] who can help pt sign up for Medicare enrollment. NEIL scanned and emailed to [employer] who will call pt. NEIL also called Nelson Giles and reiterated if pt could get assistance with bills in collections and/or getting Medicaid back to 2020. SW had faxed bills to [facility] and [facility] should have also had bills for [place] spenddown since pt also had radiation treatment also.    Clem Bautista promises to call pt tomorrow with someone to assist.     Narrative:     Barriers to Care:     Plan:    Referral:   Complementary therapies referral  Insurance/Entitlements referral Medicaid for 2020 and 2021? ([facility]- rad onc+ [facility] for med onc.)  Financial/Medication assistance referral assistance with bills in collections

## 2021-07-16 ENCOUNTER — DOCUMENTATION ONLY (OUTPATIENT)
Dept: ONCOLOGY | Age: 66
End: 2021-07-16

## 2021-07-27 ENCOUNTER — DOCUMENTATION ONLY (OUTPATIENT)
Dept: ONCOLOGY | Age: 66
End: 2021-07-27

## 2021-07-27 NOTE — PROGRESS NOTES
[employer]  Social Work Navigator Encounter     Patient Name:  Debbie Painting    Medical History:     Advance Directives:    Narrative: SW called [phone] and asked for an update on Medicaid application as ms Sherran Brunner VM says she is approved for full medicaid. Pt does not have Medicaid # or card nor do we know effective date. SW provided with REFERENCE # [id number] for reference and should be emailed along with Ana Petit from [address]. MS Ana Petit [phone]    Barriers to Care:     Plan:    Referral:   Dear Asha Dhillon,    This is a notification email to inform you that your Seeking Worker Support request (Request ID: [id number]) has been created. Please note this is a Private Request type. This means that if you submitted your request via the [facility], or if you submitted it Online but without logging into your profile, the request will not be accessible to you on [employer]. If you need to follow-up on the status of your request, you may call 3-1-1 and provide them with the above Request ID. Thank you for submitting your request.    Please do not reply to this email.

## 2021-09-07 ENCOUNTER — HOSPITAL ENCOUNTER (OUTPATIENT)
Dept: INFUSION THERAPY | Age: 66
Discharge: HOME | End: 2021-09-07

## 2021-09-07 DIAGNOSIS — C61 PROSTATE CANCER (HCC): Primary | ICD-10-CM

## 2021-09-22 ENCOUNTER — HOSPITAL ENCOUNTER (OUTPATIENT)
Dept: INFUSION THERAPY | Age: 66
Discharge: HOME | End: 2021-09-22
Payer: MEDICARE

## 2021-09-22 VITALS
BODY MASS INDEX: 29.25 KG/M2 | DIASTOLIC BLOOD PRESSURE: 90 MMHG | WEIGHT: 197.5 LBS | OXYGEN SATURATION: 98 % | SYSTOLIC BLOOD PRESSURE: 149 MMHG | HEART RATE: 89 BPM | TEMPERATURE: 98.2 F | RESPIRATION RATE: 16 BRPM | HEIGHT: 69 IN

## 2021-09-22 DIAGNOSIS — C61 PROSTATE CANCER (HCC): Primary | ICD-10-CM

## 2021-09-22 LAB
ALBUMIN SERPL-MCNC: 4.1 G/DL (ref 3.5–5)
ALBUMIN/GLOB SERPL: 1 {RATIO} (ref 1.1–2.2)
ALP SERPL-CCNC: 114 U/L (ref 45–117)
ALT SERPL-CCNC: 102 U/L (ref 12–78)
ANION GAP SERPL CALC-SCNC: 8 MMOL/L (ref 5–15)
AST SERPL-CCNC: 86 U/L (ref 15–37)
BASOPHILS # BLD: 0 K/UL (ref 0–0.1)
BASOPHILS NFR BLD: 1 % (ref 0–1)
BILIRUB SERPL-MCNC: 0.6 MG/DL (ref 0.2–1)
BUN SERPL-MCNC: 15 MG/DL (ref 6–20)
BUN/CREAT SERPL: 17 (ref 12–20)
CALCIUM SERPL-MCNC: 9.6 MG/DL (ref 8.5–10.1)
CHLORIDE SERPL-SCNC: 108 MMOL/L (ref 97–108)
CO2 SERPL-SCNC: 25 MMOL/L (ref 21–32)
CREAT SERPL-MCNC: 0.9 MG/DL (ref 0.7–1.3)
DIFFERENTIAL METHOD BLD: ABNORMAL
EOSINOPHIL # BLD: 0 K/UL (ref 0–0.4)
EOSINOPHIL NFR BLD: 1 % (ref 0–7)
ERYTHROCYTE [DISTWIDTH] IN BLOOD BY AUTOMATED COUNT: 15 % (ref 11.5–14.5)
GLOBULIN SER CALC-MCNC: 4.2 G/DL (ref 2–4)
GLUCOSE SERPL-MCNC: 107 MG/DL (ref 65–100)
HCT VFR BLD AUTO: 38.4 % (ref 36.6–50.3)
HGB BLD-MCNC: 12.4 G/DL (ref 12.1–17)
IMM GRANULOCYTES # BLD AUTO: 0 K/UL (ref 0–0.04)
IMM GRANULOCYTES NFR BLD AUTO: 0 % (ref 0–0.5)
LYMPHOCYTES # BLD: 0.8 K/UL (ref 0.8–3.5)
LYMPHOCYTES NFR BLD: 29 % (ref 12–49)
MCH RBC QN AUTO: 31.2 PG (ref 26–34)
MCHC RBC AUTO-ENTMCNC: 32.3 G/DL (ref 30–36.5)
MCV RBC AUTO: 96.5 FL (ref 80–99)
MONOCYTES # BLD: 0.5 K/UL (ref 0–1)
MONOCYTES NFR BLD: 17 % (ref 5–13)
NEUTS SEG # BLD: 1.4 K/UL (ref 1.8–8)
NEUTS SEG NFR BLD: 52 % (ref 32–75)
NRBC # BLD: 0 K/UL (ref 0–0.01)
NRBC BLD-RTO: 0 PER 100 WBC
PLATELET # BLD AUTO: 263 K/UL (ref 150–400)
PMV BLD AUTO: 9.9 FL (ref 8.9–12.9)
POTASSIUM SERPL-SCNC: 3.7 MMOL/L (ref 3.5–5.1)
PROT SERPL-MCNC: 8.3 G/DL (ref 6.4–8.2)
PSA SERPL-MCNC: 0 NG/ML (ref 0.01–4)
RBC # BLD AUTO: 3.98 M/UL (ref 4.1–5.7)
SODIUM SERPL-SCNC: 141 MMOL/L (ref 136–145)
WBC # BLD AUTO: 2.8 K/UL (ref 4.1–11.1)

## 2021-09-22 PROCEDURE — 36415 COLL VENOUS BLD VENIPUNCTURE: CPT

## 2021-09-22 PROCEDURE — 74011250636 HC RX REV CODE- 250/636

## 2021-09-22 PROCEDURE — 85025 COMPLETE CBC W/AUTO DIFF WBC: CPT

## 2021-09-22 PROCEDURE — 96401 CHEMO ANTI-NEOPL SQ/IM: CPT

## 2021-09-22 PROCEDURE — 84153 ASSAY OF PSA TOTAL: CPT

## 2021-09-22 PROCEDURE — 80053 COMPREHEN METABOLIC PANEL: CPT

## 2021-09-22 PROCEDURE — 84403 ASSAY OF TOTAL TESTOSTERONE: CPT

## 2021-09-22 RX ADMIN — LEUPROLIDE ACETATE 22.5 MG: KIT SUBCUTANEOUS at 15:18

## 2021-09-22 NOTE — PROGRESS NOTES
Pt arrived to [facility] ambulatory in no acute distress at 1500 for Eligard. Assessment unremarkable except peripheral neuropathy and urgency with urination. Labs obtained peripherally in L AC, CBC CMP PSA and Testosterone. Patient denied having any symptoms of COVID-19, i.e. SOB, coughing, fever, or generally not feeling well. Also denies having been exposed to COVID-19 recently or having had any recent contact with family/friend that has a pending COVID test.    Patient Vitals for the past 12 hrs:   Temp Pulse Resp BP SpO2   09/22/21 1458 98.2 °F (36.8 °C) 89 16 (!) 149/90 98 %     See [facility] for pending lab results. The following medications administered:  Medications Administered     leuprolide (ELIGARD 3 MONTH) injection 22.5 mg     Admin Date  09/22/2021 Action  Given Dose  22.5 mg Route  SubCUTAneous Administered By  Renetta Nicholson RN                Pt tolerated treatment well. Pt discharged ambulatory in no acute distress at 1520, accompanied by self. Next appointment 12/15/2021.

## 2021-09-24 LAB — TESTOST SERPL-MCNC: <3 NG/DL (ref 264–916)

## 2021-11-30 ENCOUNTER — APPOINTMENT (OUTPATIENT)
Dept: INFUSION THERAPY | Age: 66
End: 2021-11-30

## 2021-12-15 ENCOUNTER — HOSPITAL ENCOUNTER (OUTPATIENT)
Dept: INFUSION THERAPY | Age: 66
Discharge: HOME | End: 2021-12-15
Payer: MEDICARE

## 2021-12-15 VITALS
WEIGHT: 203.9 LBS | SYSTOLIC BLOOD PRESSURE: 148 MMHG | BODY MASS INDEX: 30.2 KG/M2 | TEMPERATURE: 97.8 F | HEIGHT: 69 IN | HEART RATE: 111 BPM | DIASTOLIC BLOOD PRESSURE: 99 MMHG | RESPIRATION RATE: 18 BRPM | OXYGEN SATURATION: 98 %

## 2021-12-15 DIAGNOSIS — C61 PROSTATE CANCER (HCC): Primary | ICD-10-CM

## 2021-12-15 LAB
ALBUMIN SERPL-MCNC: 4 G/DL (ref 3.5–5)
ALBUMIN/GLOB SERPL: 1 {RATIO} (ref 1.1–2.2)
ALP SERPL-CCNC: 200 U/L (ref 45–117)
ALT SERPL-CCNC: 105 U/L (ref 12–78)
ANION GAP SERPL CALC-SCNC: 9 MMOL/L (ref 5–15)
AST SERPL-CCNC: 40 U/L (ref 15–37)
BASOPHILS # BLD: 0 K/UL (ref 0–0.1)
BASOPHILS NFR BLD: 1 % (ref 0–1)
BILIRUB SERPL-MCNC: 0.3 MG/DL (ref 0.2–1)
BUN SERPL-MCNC: 13 MG/DL (ref 6–20)
BUN/CREAT SERPL: 15 (ref 12–20)
CALCIUM SERPL-MCNC: 10.1 MG/DL (ref 8.5–10.1)
CHLORIDE SERPL-SCNC: 106 MMOL/L (ref 97–108)
CO2 SERPL-SCNC: 25 MMOL/L (ref 21–32)
CREAT SERPL-MCNC: 0.89 MG/DL (ref 0.7–1.3)
DIFFERENTIAL METHOD BLD: ABNORMAL
EOSINOPHIL # BLD: 0.1 K/UL (ref 0–0.4)
EOSINOPHIL NFR BLD: 2 % (ref 0–7)
ERYTHROCYTE [DISTWIDTH] IN BLOOD BY AUTOMATED COUNT: 14 % (ref 11.5–14.5)
GLOBULIN SER CALC-MCNC: 4.2 G/DL (ref 2–4)
GLUCOSE SERPL-MCNC: 99 MG/DL (ref 65–100)
HCT VFR BLD AUTO: 35.9 % (ref 36.6–50.3)
HGB BLD-MCNC: 11.9 G/DL (ref 12.1–17)
IMM GRANULOCYTES # BLD AUTO: 0 K/UL (ref 0–0.04)
IMM GRANULOCYTES NFR BLD AUTO: 0 % (ref 0–0.5)
LYMPHOCYTES # BLD: 0.9 K/UL (ref 0.8–3.5)
LYMPHOCYTES NFR BLD: 22 % (ref 12–49)
MCH RBC QN AUTO: 30.4 PG (ref 26–34)
MCHC RBC AUTO-ENTMCNC: 33.1 G/DL (ref 30–36.5)
MCV RBC AUTO: 91.8 FL (ref 80–99)
MONOCYTES # BLD: 0.6 K/UL (ref 0–1)
MONOCYTES NFR BLD: 15 % (ref 5–13)
NEUTS SEG # BLD: 2.6 K/UL (ref 1.8–8)
NEUTS SEG NFR BLD: 60 % (ref 32–75)
NRBC # BLD: 0 K/UL (ref 0–0.01)
NRBC BLD-RTO: 0 PER 100 WBC
PLATELET # BLD AUTO: 295 K/UL (ref 150–400)
PMV BLD AUTO: 9.5 FL (ref 8.9–12.9)
POTASSIUM SERPL-SCNC: 3.9 MMOL/L (ref 3.5–5.1)
PROT SERPL-MCNC: 8.2 G/DL (ref 6.4–8.2)
PSA SERPL-MCNC: 0 NG/ML (ref 0.01–4)
RBC # BLD AUTO: 3.91 M/UL (ref 4.1–5.7)
SODIUM SERPL-SCNC: 140 MMOL/L (ref 136–145)
WBC # BLD AUTO: 4.3 K/UL (ref 4.1–11.1)

## 2021-12-15 PROCEDURE — 80053 COMPREHEN METABOLIC PANEL: CPT

## 2021-12-15 PROCEDURE — 74011250636 HC RX REV CODE- 250/636

## 2021-12-15 PROCEDURE — 36415 COLL VENOUS BLD VENIPUNCTURE: CPT

## 2021-12-15 PROCEDURE — 85025 COMPLETE CBC W/AUTO DIFF WBC: CPT

## 2021-12-15 PROCEDURE — 96402 CHEMO HORMON ANTINEOPL SQ/IM: CPT

## 2021-12-15 PROCEDURE — 84403 ASSAY OF TOTAL TESTOSTERONE: CPT

## 2021-12-15 PROCEDURE — 84153 ASSAY OF PSA TOTAL: CPT

## 2021-12-15 RX ADMIN — LEUPROLIDE ACETATE 22.5 MG: KIT SUBCUTANEOUS at 15:13

## 2021-12-17 LAB — TESTOST SERPL-MCNC: <3 NG/DL (ref 264–916)

## 2022-03-18 PROBLEM — C61 PROSTATE CANCER (HCC): Status: ACTIVE | Noted: 2020-04-17

## 2022-03-31 NOTE — PROGRESS NOTES
Dali Mendoza is a 73 y.o. male here for 6 month follow up for prostate cancer. Pt states he is doing well. Sometimes he thinks too much about things and gets worried. 1. Have you been to the ER, urgent care clinic since your last visit? Hospitalized since your last visit? no    2. Have you seen or consulted any other health care providers outside of the [address] [place] [address] since your last visit? Include any pap smears or colon screening.  Hailey Cool

## 2022-04-01 ENCOUNTER — OFFICE VISIT (OUTPATIENT)
Dept: ONCOLOGY | Age: 67
End: 2022-04-01
Payer: MEDICARE

## 2022-04-01 VITALS
SYSTOLIC BLOOD PRESSURE: 123 MMHG | TEMPERATURE: 98.2 F | HEIGHT: 69 IN | WEIGHT: 196.4 LBS | DIASTOLIC BLOOD PRESSURE: 72 MMHG | HEART RATE: 73 BPM | BODY MASS INDEX: 29.09 KG/M2 | OXYGEN SATURATION: 98 %

## 2022-04-01 DIAGNOSIS — C61 PRIMARY PROSTATE ADENOCARCINOMA (HCC): Primary | ICD-10-CM

## 2022-04-01 PROCEDURE — G8536 NO DOC ELDER MAL SCRN: HCPCS | Performed by: INTERNAL MEDICINE

## 2022-04-01 PROCEDURE — 99214 OFFICE O/P EST MOD 30 MIN: CPT | Performed by: INTERNAL MEDICINE

## 2022-04-01 PROCEDURE — G8427 DOCREV CUR MEDS BY ELIG CLIN: HCPCS | Performed by: INTERNAL MEDICINE

## 2022-04-01 PROCEDURE — G8419 CALC BMI OUT NRM PARAM NOF/U: HCPCS | Performed by: INTERNAL MEDICINE

## 2022-04-01 PROCEDURE — 1101F PT FALLS ASSESS-DOCD LE1/YR: CPT | Performed by: INTERNAL MEDICINE

## 2022-04-01 PROCEDURE — G8432 DEP SCR NOT DOC, RNG: HCPCS | Performed by: INTERNAL MEDICINE

## 2022-04-01 PROCEDURE — 3017F COLORECTAL CA SCREEN DOC REV: CPT | Performed by: INTERNAL MEDICINE

## 2022-04-01 NOTE — PROGRESS NOTES
He accidentally mixed up his times and days for eligard. He will need the eligard as soon as possible x 1 more year.   We will call him with updated time and etc.

## 2022-04-01 NOTE — PROGRESS NOTES
[facility] [address], [address] [facility], [address] [place] [address], [address] [place] [address]  [phone]      Oncology Progress Note         Patient: Narda Johnson MRN: [id number]  SSN: [id number]    YOB: 1955  Age: 73 y.o. Sex: male      Subjective:      Narda Johnson is a 73 y.o. male who I am seeing in consultation for a diagnosis of high risk localized prostate carcinoma. He was noted to have an elevated PSA of 215 in May 2019 and was seen by [address] [place] [address] Urology. A biopsy of the prostate revealed 1/12 core with Alicia 4+3 and 11/12 cores with Charlo 3+4 prostate adenocarcinoma. He was referred to Dr. Chris Souza who in turn sent the patient to me to start Lupron. He feels well and denies bone pains or weight loss. He has completed EBRT to the prostate and is on ADT. He is doing well. He missed his Eligard dose last month. Review of Systems:    Constitutional: negative  Eyes: negative  Ears, Nose, Mouth, Throat, and Face: negative  Respiratory: negative  Cardiovascular: negative  Gastrointestinal: negative  Genitourinary:negative  Integument/Breast: negative  Hematologic/Lymphatic: negative  Musculoskeletal:negative  Neurological: negative      No past medical history on file. No past surgical history on file. Family History   Problem Relation Age of Onset    Heart Disease Brother         / had AICD     Social History     Tobacco Use    Smoking status: Current Every Day Smoker    Smokeless tobacco: Never Used    Tobacco comment: / day   Substance Use Topics    Alcohol use: Yes     Comment: beer/day - [place] [employer]      Prior to Admission medications    Medication Sig Start Date End Date Taking? Authorizing Provider   amLODIPine (NORVASC) 10 mg tablet Take  by mouth daily. Yes Provider, Historical   tamsulosin (FLOMAX) 0.4 mg capsule Take 0.4 mg by mouth daily.    Yes Provider, Historical              No Known Allergies        Objective:     Vitals:    04/01/22 1044   BP: 123/72   Pulse: 73   Temp: 98.2 °F (36.8 °C)   TempSrc: Temporal   SpO2: 98%   Weight: 196 lb 6.4 oz (89.1 kg)   Height: 5' 9\" (1.753 m)        Physical Exam:    GENERAL: alert, cooperative, no distress, appears stated age  EYE: conjunctivae/corneas clear. PERRL, EOM's intact  LYMPHATIC: Cervical, supraclavicular, and axillary nodes normal.   THROAT & NECK: normal and no erythema or exudates noted. LUNG: clear to auscultation bilaterally  HEART: regular rate and rhythm, S1, S2 normal, no murmur, click, rub or gallop  ABDOMEN: soft, non-tender. Bowel sounds normal. No masses,  no organomegaly  EXTREMITIES:  extremities normal, atraumatic, no cyanosis or edema  SKIN: Normal.  NEUROLOGIC: AOx3. Gait normal. Reflexes and motor strength normal and symmetric. Cranial nerves 2-12 and sensation grossly intact. Physical exam and ROS has been modified from a prior visit to make it relevant and current      Assessment:     1. Prostate carcinoma:       Non-metastatic      Asymptomatic   Alicia 4+3   Pre-    ECOG PS 0  Intent of therapy - curative  Prognosis: excelent    High risk based on NCCN classification    Receiving Eligard  Received EBRT  PSA remains at 0  Doing well  Asymptomatic    He missed his appt in March. I shall resume ADT. Plan:       1. Continue ADT until April 2023  2. Return in 3 months      Signed by: Tamika Oconnor MD                     April 1, 2022        CCMarcie Campuzano MD

## 2022-04-01 NOTE — LETTER
4/1/2022    Patient: Rainer Moran   YOB: 1955   Date of Visit: 4/1/2022     Olvin Norman MD  [address] [place] [address]  Via Fax: [phone]    Dear Olvin Norman MD,      Thank you for referring Mr. Mccoy Back to [address] [place] [address] for evaluation. My notes for this consultation are attached. If you have questions, please do not hesitate to call me. I look forward to following your patient along with you.       Sincerely,    Afia Hawkins MD

## 2022-04-15 ENCOUNTER — HOSPITAL ENCOUNTER (OUTPATIENT)
Dept: INFUSION THERAPY | Age: 67
Discharge: HOME | End: 2022-04-15
Payer: MEDICARE

## 2022-04-15 VITALS
HEIGHT: 69 IN | WEIGHT: 196.6 LBS | HEART RATE: 85 BPM | BODY MASS INDEX: 29.12 KG/M2 | RESPIRATION RATE: 16 BRPM | SYSTOLIC BLOOD PRESSURE: 125 MMHG | OXYGEN SATURATION: 97 % | DIASTOLIC BLOOD PRESSURE: 77 MMHG

## 2022-04-15 DIAGNOSIS — C61 PROSTATE CANCER (HCC): Primary | ICD-10-CM

## 2022-04-15 LAB
ALBUMIN SERPL-MCNC: 4.2 G/DL (ref 3.5–5)
ALBUMIN/GLOB SERPL: 1.1 {RATIO} (ref 1.1–2.2)
ALP SERPL-CCNC: 112 U/L (ref 45–117)
ALT SERPL-CCNC: 45 U/L (ref 12–78)
ANION GAP SERPL CALC-SCNC: 6 MMOL/L (ref 5–15)
AST SERPL-CCNC: 40 U/L (ref 15–37)
BASOPHILS # BLD: 0 K/UL (ref 0–0.1)
BASOPHILS NFR BLD: 1 % (ref 0–1)
BILIRUB SERPL-MCNC: 0.6 MG/DL (ref 0.2–1)
BUN SERPL-MCNC: 22 MG/DL (ref 6–20)
BUN/CREAT SERPL: 22 (ref 12–20)
CALCIUM SERPL-MCNC: 9.4 MG/DL (ref 8.5–10.1)
CHLORIDE SERPL-SCNC: 108 MMOL/L (ref 97–108)
CO2 SERPL-SCNC: 24 MMOL/L (ref 21–32)
CREAT SERPL-MCNC: 1.02 MG/DL (ref 0.7–1.3)
DIFFERENTIAL METHOD BLD: ABNORMAL
EOSINOPHIL # BLD: 0.1 K/UL (ref 0–0.4)
EOSINOPHIL NFR BLD: 3 % (ref 0–7)
ERYTHROCYTE [DISTWIDTH] IN BLOOD BY AUTOMATED COUNT: 14.4 % (ref 11.5–14.5)
GLOBULIN SER CALC-MCNC: 3.7 G/DL (ref 2–4)
GLUCOSE SERPL-MCNC: 112 MG/DL (ref 65–100)
HCT VFR BLD AUTO: 36.3 % (ref 36.6–50.3)
HGB BLD-MCNC: 12.1 G/DL (ref 12.1–17)
IMM GRANULOCYTES # BLD AUTO: 0 K/UL (ref 0–0.04)
IMM GRANULOCYTES NFR BLD AUTO: 0 % (ref 0–0.5)
LYMPHOCYTES # BLD: 0.9 K/UL (ref 0.8–3.5)
LYMPHOCYTES NFR BLD: 29 % (ref 12–49)
MCH RBC QN AUTO: 30.5 PG (ref 26–34)
MCHC RBC AUTO-ENTMCNC: 33.3 G/DL (ref 30–36.5)
MCV RBC AUTO: 91.4 FL (ref 80–99)
MONOCYTES # BLD: 0.5 K/UL (ref 0–1)
MONOCYTES NFR BLD: 16 % (ref 5–13)
NEUTS SEG # BLD: 1.7 K/UL (ref 1.8–8)
NEUTS SEG NFR BLD: 51 % (ref 32–75)
NRBC # BLD: 0 K/UL (ref 0–0.01)
NRBC BLD-RTO: 0 PER 100 WBC
PLATELET # BLD AUTO: 251 K/UL (ref 150–400)
PMV BLD AUTO: 9.3 FL (ref 8.9–12.9)
POTASSIUM SERPL-SCNC: 3.7 MMOL/L (ref 3.5–5.1)
PROT SERPL-MCNC: 7.9 G/DL (ref 6.4–8.2)
RBC # BLD AUTO: 3.97 M/UL (ref 4.1–5.7)
SODIUM SERPL-SCNC: 138 MMOL/L (ref 136–145)
WBC # BLD AUTO: 3.2 K/UL (ref 4.1–11.1)

## 2022-04-15 PROCEDURE — 80053 COMPREHEN METABOLIC PANEL: CPT

## 2022-04-15 PROCEDURE — 85025 COMPLETE CBC W/AUTO DIFF WBC: CPT

## 2022-04-15 PROCEDURE — 74011250636 HC RX REV CODE- 250/636: Performed by: INTERNAL MEDICINE

## 2022-04-15 PROCEDURE — 36415 COLL VENOUS BLD VENIPUNCTURE: CPT

## 2022-04-15 PROCEDURE — 96402 CHEMO HORMON ANTINEOPL SQ/IM: CPT

## 2022-04-15 PROCEDURE — 84403 ASSAY OF TOTAL TESTOSTERONE: CPT

## 2022-04-15 PROCEDURE — 84153 ASSAY OF PSA TOTAL: CPT

## 2022-04-15 RX ORDER — ATORVASTATIN CALCIUM 10 MG/1
10 TABLET, FILM COATED ORAL DAILY
COMMUNITY

## 2022-04-15 RX ADMIN — LEUPROLIDE ACETATE 22.5 MG: KIT SUBCUTANEOUS at 10:15

## 2022-04-15 NOTE — PROGRESS NOTES
Outpatient Infusion Center Progress Note    1000  Pt arrived in stable condition and in no distress for Eliguard injection. Assessment completed. Patient denied having any symptoms of COVID-19, i.e. SOB, coughing, fever, or generally not feeling well. Also denies having been exposed to COVID-19 recently or having had any recent contact with family/friend that has a pending COVID test.    Labs obtained per order and sent for processing. Patient Vitals for the past 12 hrs:   Pulse Resp BP SpO2   04/15/22 1005 85 16 125/77 97 %      The following medications administered:  Medications Administered     leuprolide (ELIGARD 3 MONTH) injection 22.5 mg     Admin Date  04/15/2022 Action  Given Dose  22.5 mg Route  SubCUTAneous Administered By  Mati Rios RN                Pt tolerated treatment well. No s/s of adverse reaction noted. Pt provided with education on possible side effects of medication along with discharge instructions. Pt verbalized understanding. 1020  Pt discharged in no acute distress.  Next appointment:    Future Appointments   Date Time Provider Melissa Castillo   7/8/2022 11:00 AM [facility] [address] REG   7/8/2022 11:15 AM Vinayak Dumont NP ONC BS AMB   9/30/2022 10:00 AM [address] REG

## 2022-04-16 LAB
PSA SERPL-MCNC: 0 NG/ML (ref 0.01–4)
TESTOST SERPL-MCNC: <3 NG/DL (ref 264–916)

## 2022-06-01 ENCOUNTER — APPOINTMENT (OUTPATIENT)
Dept: INFUSION THERAPY | Age: 67
End: 2022-06-01
Payer: MEDICARE

## 2022-07-07 NOTE — PROGRESS NOTES
Fannie Beard is a 73 y.o. male here for 3 month follow up for prostate cancer. He is receiving Lupron today. Pt is now working part time because he got tired of laying around. Does have a hard time getting a full nights sleep. Pt does have several mild hot flashes per day. He has lost 8 lbs since last visit. Pt not always able to empty his bladder completely while urinating. 1. Have you been to the ER, urgent care clinic since your last visit? Hospitalized since your last visit? no  2. Have you seen or consulted any other health care providers outside of the [address] [place] [address] since your last visit? Include any pap smears or colon screening.    Brie Burciaga

## 2022-07-08 ENCOUNTER — HOSPITAL ENCOUNTER (OUTPATIENT)
Dept: INFUSION THERAPY | Age: 67
Discharge: HOME | End: 2022-07-08
Payer: MEDICARE

## 2022-07-08 ENCOUNTER — OFFICE VISIT (OUTPATIENT)
Dept: ONCOLOGY | Age: 67
End: 2022-07-08
Payer: MEDICARE

## 2022-07-08 VITALS
HEART RATE: 78 BPM | BODY MASS INDEX: 27.88 KG/M2 | OXYGEN SATURATION: 98 % | WEIGHT: 188.2 LBS | TEMPERATURE: 98 F | SYSTOLIC BLOOD PRESSURE: 122 MMHG | HEIGHT: 69 IN | DIASTOLIC BLOOD PRESSURE: 76 MMHG

## 2022-07-08 VITALS
RESPIRATION RATE: 16 BRPM | OXYGEN SATURATION: 98 % | DIASTOLIC BLOOD PRESSURE: 86 MMHG | SYSTOLIC BLOOD PRESSURE: 123 MMHG | HEIGHT: 69 IN | TEMPERATURE: 97.4 F | BODY MASS INDEX: 28.82 KG/M2 | WEIGHT: 194.6 LBS | HEART RATE: 78 BPM

## 2022-07-08 DIAGNOSIS — C61 PROSTATE CANCER (HCC): Primary | ICD-10-CM

## 2022-07-08 DIAGNOSIS — C61 PRIMARY PROSTATE ADENOCARCINOMA (HCC): Primary | ICD-10-CM

## 2022-07-08 LAB
ALBUMIN SERPL-MCNC: 4.2 G/DL (ref 3.5–5)
ALBUMIN/GLOB SERPL: 1.3 {RATIO} (ref 1.1–2.2)
ALP SERPL-CCNC: 81 U/L (ref 45–117)
ALT SERPL-CCNC: 49 U/L (ref 12–78)
ANION GAP SERPL CALC-SCNC: 7 MMOL/L (ref 5–15)
AST SERPL-CCNC: 49 U/L (ref 15–37)
BASOPHILS # BLD: 0 K/UL (ref 0–0.1)
BASOPHILS NFR BLD: 1 % (ref 0–1)
BILIRUB SERPL-MCNC: 0.5 MG/DL (ref 0.2–1)
BUN SERPL-MCNC: 12 MG/DL (ref 6–20)
BUN/CREAT SERPL: 15 (ref 12–20)
CALCIUM SERPL-MCNC: 9.2 MG/DL (ref 8.5–10.1)
CHLORIDE SERPL-SCNC: 108 MMOL/L (ref 97–108)
CO2 SERPL-SCNC: 25 MMOL/L (ref 21–32)
CREAT SERPL-MCNC: 0.8 MG/DL (ref 0.7–1.3)
DIFFERENTIAL METHOD BLD: ABNORMAL
EOSINOPHIL # BLD: 0.1 K/UL (ref 0–0.4)
EOSINOPHIL NFR BLD: 2 % (ref 0–7)
ERYTHROCYTE [DISTWIDTH] IN BLOOD BY AUTOMATED COUNT: 14.5 % (ref 11.5–14.5)
GLOBULIN SER CALC-MCNC: 3.3 G/DL (ref 2–4)
GLUCOSE SERPL-MCNC: 112 MG/DL (ref 65–100)
HCT VFR BLD AUTO: 35.2 % (ref 36.6–50.3)
HGB BLD-MCNC: 11.6 G/DL (ref 12.1–17)
IMM GRANULOCYTES # BLD AUTO: 0 K/UL (ref 0–0.04)
IMM GRANULOCYTES NFR BLD AUTO: 0 % (ref 0–0.5)
LYMPHOCYTES # BLD: 0.9 K/UL (ref 0.8–3.5)
LYMPHOCYTES NFR BLD: 29 % (ref 12–49)
MCH RBC QN AUTO: 30 PG (ref 26–34)
MCHC RBC AUTO-ENTMCNC: 33 G/DL (ref 30–36.5)
MCV RBC AUTO: 91 FL (ref 80–99)
MONOCYTES # BLD: 0.5 K/UL (ref 0–1)
MONOCYTES NFR BLD: 17 % (ref 5–13)
NEUTS SEG # BLD: 1.5 K/UL (ref 1.8–8)
NEUTS SEG NFR BLD: 51 % (ref 32–75)
NRBC # BLD: 0 K/UL (ref 0–0.01)
NRBC BLD-RTO: 0 PER 100 WBC
PLATELET # BLD AUTO: 228 K/UL (ref 150–400)
PMV BLD AUTO: 9.1 FL (ref 8.9–12.9)
POTASSIUM SERPL-SCNC: 3.8 MMOL/L (ref 3.5–5.1)
PROT SERPL-MCNC: 7.5 G/DL (ref 6.4–8.2)
PSA SERPL-MCNC: 0 NG/ML (ref 0.01–4)
RBC # BLD AUTO: 3.87 M/UL (ref 4.1–5.7)
SODIUM SERPL-SCNC: 140 MMOL/L (ref 136–145)
WBC # BLD AUTO: 3 K/UL (ref 4.1–11.1)

## 2022-07-08 PROCEDURE — 84153 ASSAY OF PSA TOTAL: CPT

## 2022-07-08 PROCEDURE — G8536 NO DOC ELDER MAL SCRN: HCPCS | Performed by: INTERNAL MEDICINE

## 2022-07-08 PROCEDURE — 84403 ASSAY OF TOTAL TESTOSTERONE: CPT

## 2022-07-08 PROCEDURE — 36415 COLL VENOUS BLD VENIPUNCTURE: CPT

## 2022-07-08 PROCEDURE — 3017F COLORECTAL CA SCREEN DOC REV: CPT | Performed by: INTERNAL MEDICINE

## 2022-07-08 PROCEDURE — 96402 CHEMO HORMON ANTINEOPL SQ/IM: CPT

## 2022-07-08 PROCEDURE — 99215 OFFICE O/P EST HI 40 MIN: CPT | Performed by: INTERNAL MEDICINE

## 2022-07-08 PROCEDURE — 1101F PT FALLS ASSESS-DOCD LE1/YR: CPT | Performed by: INTERNAL MEDICINE

## 2022-07-08 PROCEDURE — G8510 SCR DEP NEG, NO PLAN REQD: HCPCS | Performed by: INTERNAL MEDICINE

## 2022-07-08 PROCEDURE — 85025 COMPLETE CBC W/AUTO DIFF WBC: CPT

## 2022-07-08 PROCEDURE — 1123F ACP DISCUSS/DSCN MKR DOCD: CPT | Performed by: INTERNAL MEDICINE

## 2022-07-08 PROCEDURE — G8417 CALC BMI ABV UP PARAM F/U: HCPCS | Performed by: INTERNAL MEDICINE

## 2022-07-08 PROCEDURE — 80053 COMPREHEN METABOLIC PANEL: CPT

## 2022-07-08 PROCEDURE — G8427 DOCREV CUR MEDS BY ELIG CLIN: HCPCS | Performed by: INTERNAL MEDICINE

## 2022-07-08 PROCEDURE — 74011250636 HC RX REV CODE- 250/636: Performed by: INTERNAL MEDICINE

## 2022-07-08 RX ADMIN — LEUPROLIDE ACETATE 22.5 MG: KIT SUBCUTANEOUS at 11:41

## 2022-07-08 NOTE — LETTER
7/8/2022    Patient: Fannie Beard   YOB: 1955   Date of Visit: 7/8/2022     Mirian Medina MD  [address] [place] [address]  Via Fax: [phone]    Dear Mirian Medina MD,      Thank you for referring Mr. Louisa Quintanilla to [address] [place] [address] for evaluation. My notes for this consultation are attached. If you have questions, please do not hesitate to call me. I look forward to following your patient along with you.       Sincerely,    Javy Isaacs MD

## 2022-07-08 NOTE — PROGRESS NOTES
Outpatient Infusion Center Short Visit Progress Note    1130 Patient admitted to [facility] for Eligard/labs ambulatory in stable condition. Assessment completed. No new concerns voiced. Covid Screening      1. Do you have any symptoms of COVID-19? SOB, coughing, fever, or generally not feeling well ? NO  2. Have you been exposed to COVID-19 recently? NO  3. Have you had any recent contact with family/friend that has a pending COVID test? NO    Vital Signs:  Visit Vitals  /86 (BP 1 Location: Left arm, BP Patient Position: Sitting)   Pulse 78   Temp 97.4 °F (36.3 °C)   Resp 16   Ht 5' 9\" (1.753 m)   Wt 88.3 kg (194 lb 9.6 oz)   SpO2 98%   BMI 28.74 kg/m²         Peripheral stick to right ac; + blood return noted, labs drawn and sent for processing. Lab Results:  Recent Results (from the past 12 hour(s))   CBC WITH AUTOMATED DIFF    Collection Time: 07/08/22 11:37 AM   Result Value Ref Range    WBC 3.0 (L) 4.1 - 11.1 K/uL    RBC 3.87 (L) 4.10 - 5.70 M/uL    HGB 11.6 (L) 12.1 - 17.0 g/dL    HCT 35.2 (L) 36.6 - 50.3 %    MCV 91.0 80.0 - 99.0 FL    MCH 30.0 26.0 - 34.0 PG    MCHC 33.0 30.0 - 36.5 g/dL    RDW 14.5 11.5 - 14.5 %    PLATELET 154 241 - 405 K/uL    MPV 9.1 8.9 - 12.9 FL    NRBC 0.0 0  WBC    ABSOLUTE NRBC 0.00 0.00 - 0.01 K/uL    NEUTROPHILS 51 32 - 75 %    LYMPHOCYTES 29 12 - 49 %    MONOCYTES 17 (H) 5 - 13 %    EOSINOPHILS 2 0 - 7 %    BASOPHILS 1 0 - 1 %    IMMATURE GRANULOCYTES 0 0.0 - 0.5 %    ABS. NEUTROPHILS 1.5 (L) 1.8 - 8.0 K/UL    ABS. LYMPHOCYTES 0.9 0.8 - 3.5 K/UL    ABS. MONOCYTES 0.5 0.0 - 1.0 K/UL    ABS. EOSINOPHILS 0.1 0.0 - 0.4 K/UL    ABS. BASOPHILS 0.0 0.0 - 0.1 K/UL    ABS. IMM.  GRANS. 0.0 0.00 - 0.04 K/UL    DF AUTOMATED     METABOLIC PANEL, COMPREHENSIVE    Collection Time: 07/08/22 11:37 AM   Result Value Ref Range    Sodium 140 136 - 145 mmol/L    Potassium 3.8 3.5 - 5.1 mmol/L    Chloride 108 97 - 108 mmol/L    CO2 25 21 - 32 mmol/L    Anion gap 7 5 - 15 mmol/L Glucose 112 (H) 65 - 100 mg/dL    BUN 12 6 - 20 MG/DL    Creatinine 0.80 0.70 - 1.30 MG/DL    BUN/Creatinine ratio 15 12 - 20      GFR est AA >60 >60 ml/min/1.73m2    GFR est non-AA >60 >60 ml/min/1.73m2    Calcium 9.2 8.5 - 10.1 MG/DL    Bilirubin, total 0.5 0.2 - 1.0 MG/DL    ALT (SGPT) 49 12 - 78 U/L    AST (SGOT) 49 (H) 15 - 37 U/L    Alk. phosphatase 81 45 - 117 U/L    Protein, total 7.5 6.4 - 8.2 g/dL    Albumin 4.2 3.5 - 5.0 g/dL    Globulin 3.3 2.0 - 4.0 g/dL    A-G Ratio 1.3 1.1 - 2.2       Some albs remain in processing at time of this note, please check connect care for results. Medications:  Medications Administered     leuprolide (ELIGARD 3 MONTH) injection 22.5 mg     Admin Date  07/08/2022 Action  Given Dose  22.5 mg Route  SubCUTAneous Administered By  Mozell Borne A              Given in right side of abdomen. Patient tolerated treatment well. Patient discharged from [place] [address] ambulatory in no distress at 1145. Patient aware of next appointment.     Future Appointments   Date Time Provider Melissa Castillo   9/30/2022 10:00 AM [facility]

## 2022-07-08 NOTE — PROGRESS NOTES
[facility] [address], [address] hospitals, [address] [place] [address], [address] [place] [address]  [phone]      Oncology Progress Note         Patient: Khari Barba MRN: [id number]  SSN: [id number]    YOB: 1955  Age: 73 y.o. Sex: male      Subjective:      Khari Barba is a 73 y.o. male who I am seeing in consultation for a diagnosis of high risk localized prostate carcinoma. He was noted to have an elevated PSA of 215 in May 2019 and was seen by [facility]. A biopsy of the prostate revealed 1/12 core with Alicia 4+3 and 11/12 cores with Alicia 3+4 prostate adenocarcinoma. He was referred to Dr. Charlotte Monzon who in turn sent the patient to me to start Lupron. He feels well and denies bone pains or weight loss. He has completed EBRT to the prostate and is on ADT. He is doing well. Review of Systems:    Constitutional: negative  Eyes: negative  Ears, Nose, Mouth, Throat, and Face: negative  Respiratory: negative  Cardiovascular: negative  Gastrointestinal: negative  Genitourinary:negative  Integument/Breast: negative  Hematologic/Lymphatic: negative  Musculoskeletal:negative  Neurological: negative      No past medical history on file. No past surgical history on file. Family History   Problem Relation Age of Onset    Heart Disease Brother         / had AICD     Social History     Tobacco Use    Smoking status: Current Every Day Smoker    Smokeless tobacco: Never Used    Tobacco comment: 1/2  day   Substance Use Topics    Alcohol use: Yes     Comment: beer/day - [place] [employer]      Prior to Admission medications    Medication Sig Start Date End Date Taking? Authorizing Provider   amLODIPine (NORVASC) 10 mg tablet Take  by mouth daily. Yes Provider, Historical   atorvastatin (LIPITOR) 10 mg tablet Take 10 mg by mouth daily.   Patient not taking: Reported on 2022    Provider, Historical              No Known Allergies        Objective:     Vitals:    07/08/22 1155   BP: 122/76   Pulse: 78   Temp: 98 °F (36.7 °C)   TempSrc: Temporal   SpO2: 98%   Weight: 188 lb 3.2 oz (85.4 kg)   Height: 5' 9\" (1.753 m)        Physical Exam:    GENERAL: alert, cooperative, no distress, appears stated age  EYE: conjunctivae/corneas clear. PERRL, EOM's intact  LYMPHATIC: Cervical, supraclavicular, and axillary nodes normal.   THROAT & NECK: normal and no erythema or exudates noted. LUNG: clear to auscultation bilaterally  HEART: regular rate and rhythm, S1, S2 normal, no murmur, click, rub or gallop  ABDOMEN: soft, non-tender. Bowel sounds normal. No masses,  no organomegaly  EXTREMITIES:  extremities normal, atraumatic, no cyanosis or edema  SKIN: Normal.  NEUROLOGIC: AOx3. Gait normal. Reflexes and motor strength normal and symmetric. Cranial nerves 2-12 and sensation grossly intact. Physical exam and ROS has been modified from a prior visit to make it relevant and current      Assessment:     1. Prostate carcinoma:       Non-metastatic      Asymptomatic   [place]man 4+3   Pre-    ECOG PS 0  Intent of therapy - curative  Prognosis: excelent    High risk based on NCCN classification    Receiving Eligard  Received EBRT  PSA remains at 0  Doing well  Asymptomatic      Plan:     1. Continue ADT until April 2023  2. Return in 3 months      Signed by: Jacque Ortiz MD                     July 8, 2022        CC.  Beena Mata MD

## 2022-07-11 LAB — TESTOST SERPL-MCNC: <3 NG/DL (ref 264–916)

## 2022-08-24 ENCOUNTER — APPOINTMENT (OUTPATIENT)
Dept: INFUSION THERAPY | Age: 67
End: 2022-08-24
Payer: MEDICARE

## 2022-09-30 ENCOUNTER — OFFICE VISIT (OUTPATIENT)
Dept: ONCOLOGY | Age: 67
End: 2022-09-30
Payer: MEDICARE

## 2022-09-30 ENCOUNTER — HOSPITAL ENCOUNTER (OUTPATIENT)
Dept: INFUSION THERAPY | Age: 67
Discharge: HOME | End: 2022-09-30
Payer: MEDICARE

## 2022-09-30 VITALS
BODY MASS INDEX: 28.16 KG/M2 | OXYGEN SATURATION: 100 % | DIASTOLIC BLOOD PRESSURE: 92 MMHG | SYSTOLIC BLOOD PRESSURE: 141 MMHG | HEART RATE: 77 BPM | TEMPERATURE: 97.5 F | RESPIRATION RATE: 16 BRPM | WEIGHT: 190.7 LBS

## 2022-09-30 VITALS
BODY MASS INDEX: 28.14 KG/M2 | WEIGHT: 190 LBS | HEART RATE: 77 BPM | DIASTOLIC BLOOD PRESSURE: 92 MMHG | SYSTOLIC BLOOD PRESSURE: 141 MMHG | TEMPERATURE: 97.5 F | HEIGHT: 69 IN | OXYGEN SATURATION: 100 % | RESPIRATION RATE: 16 BRPM

## 2022-09-30 DIAGNOSIS — C61 PROSTATE CANCER (HCC): Primary | ICD-10-CM

## 2022-09-30 DIAGNOSIS — C61 PRIMARY PROSTATE ADENOCARCINOMA (HCC): Primary | ICD-10-CM

## 2022-09-30 LAB
ALBUMIN SERPL-MCNC: 4 G/DL (ref 3.5–5)
ALBUMIN/GLOB SERPL: 1 {RATIO} (ref 1.1–2.2)
ALP SERPL-CCNC: 127 U/L (ref 45–117)
ALT SERPL-CCNC: 52 U/L (ref 12–78)
ANION GAP SERPL CALC-SCNC: 4 MMOL/L (ref 5–15)
AST SERPL-CCNC: 71 U/L (ref 15–37)
BASOPHILS # BLD: 0 K/UL (ref 0–0.1)
BASOPHILS NFR BLD: 1 % (ref 0–1)
BILIRUB SERPL-MCNC: 0.6 MG/DL (ref 0.2–1)
BUN SERPL-MCNC: 11 MG/DL (ref 6–20)
BUN/CREAT SERPL: 13 (ref 12–20)
CALCIUM SERPL-MCNC: 9.1 MG/DL (ref 8.5–10.1)
CHLORIDE SERPL-SCNC: 105 MMOL/L (ref 97–108)
CO2 SERPL-SCNC: 28 MMOL/L (ref 21–32)
CREAT SERPL-MCNC: 0.86 MG/DL (ref 0.7–1.3)
DIFFERENTIAL METHOD BLD: ABNORMAL
EOSINOPHIL # BLD: 0.1 K/UL (ref 0–0.4)
EOSINOPHIL NFR BLD: 2 % (ref 0–7)
ERYTHROCYTE [DISTWIDTH] IN BLOOD BY AUTOMATED COUNT: 14.6 % (ref 11.5–14.5)
GLOBULIN SER CALC-MCNC: 4.1 G/DL (ref 2–4)
GLUCOSE SERPL-MCNC: 111 MG/DL (ref 65–100)
HCT VFR BLD AUTO: 36.7 % (ref 36.6–50.3)
HGB BLD-MCNC: 12.2 G/DL (ref 12.1–17)
IMM GRANULOCYTES # BLD AUTO: 0 K/UL (ref 0–0.04)
IMM GRANULOCYTES NFR BLD AUTO: 0 % (ref 0–0.5)
LYMPHOCYTES # BLD: 0.9 K/UL (ref 0.8–3.5)
LYMPHOCYTES NFR BLD: 26 % (ref 12–49)
MCH RBC QN AUTO: 31.1 PG (ref 26–34)
MCHC RBC AUTO-ENTMCNC: 33.2 G/DL (ref 30–36.5)
MCV RBC AUTO: 93.6 FL (ref 80–99)
MONOCYTES # BLD: 0.5 K/UL (ref 0–1)
MONOCYTES NFR BLD: 15 % (ref 5–13)
NEUTS SEG # BLD: 2 K/UL (ref 1.8–8)
NEUTS SEG NFR BLD: 56 % (ref 32–75)
NRBC # BLD: 0 K/UL (ref 0–0.01)
NRBC BLD-RTO: 0 PER 100 WBC
PLATELET # BLD AUTO: 252 K/UL (ref 150–400)
PMV BLD AUTO: 9.3 FL (ref 8.9–12.9)
POTASSIUM SERPL-SCNC: 3.8 MMOL/L (ref 3.5–5.1)
PROT SERPL-MCNC: 8.1 G/DL (ref 6.4–8.2)
PSA SERPL-MCNC: 0 NG/ML (ref 0.01–4)
RBC # BLD AUTO: 3.92 M/UL (ref 4.1–5.7)
SODIUM SERPL-SCNC: 137 MMOL/L (ref 136–145)
WBC # BLD AUTO: 3.5 K/UL (ref 4.1–11.1)

## 2022-09-30 PROCEDURE — G8417 CALC BMI ABV UP PARAM F/U: HCPCS | Performed by: INTERNAL MEDICINE

## 2022-09-30 PROCEDURE — 1123F ACP DISCUSS/DSCN MKR DOCD: CPT | Performed by: INTERNAL MEDICINE

## 2022-09-30 PROCEDURE — 85025 COMPLETE CBC W/AUTO DIFF WBC: CPT

## 2022-09-30 PROCEDURE — 96402 CHEMO HORMON ANTINEOPL SQ/IM: CPT

## 2022-09-30 PROCEDURE — 36415 COLL VENOUS BLD VENIPUNCTURE: CPT

## 2022-09-30 PROCEDURE — 3017F COLORECTAL CA SCREEN DOC REV: CPT | Performed by: INTERNAL MEDICINE

## 2022-09-30 PROCEDURE — 1101F PT FALLS ASSESS-DOCD LE1/YR: CPT | Performed by: INTERNAL MEDICINE

## 2022-09-30 PROCEDURE — G8536 NO DOC ELDER MAL SCRN: HCPCS | Performed by: INTERNAL MEDICINE

## 2022-09-30 PROCEDURE — 84403 ASSAY OF TOTAL TESTOSTERONE: CPT

## 2022-09-30 PROCEDURE — 99214 OFFICE O/P EST MOD 30 MIN: CPT | Performed by: INTERNAL MEDICINE

## 2022-09-30 PROCEDURE — 84153 ASSAY OF PSA TOTAL: CPT

## 2022-09-30 PROCEDURE — 80053 COMPREHEN METABOLIC PANEL: CPT

## 2022-09-30 PROCEDURE — G8510 SCR DEP NEG, NO PLAN REQD: HCPCS | Performed by: INTERNAL MEDICINE

## 2022-09-30 PROCEDURE — 74011250636 HC RX REV CODE- 250/636: Performed by: INTERNAL MEDICINE

## 2022-09-30 PROCEDURE — G8427 DOCREV CUR MEDS BY ELIG CLIN: HCPCS | Performed by: INTERNAL MEDICINE

## 2022-09-30 RX ORDER — SILDENAFIL 100 MG/1
100 TABLET, FILM COATED ORAL AS NEEDED
COMMUNITY
Start: 2022-08-22 | End: 2023-08-22

## 2022-09-30 RX ADMIN — LEUPROLIDE ACETATE 22.5 MG: KIT SUBCUTANEOUS at 10:13

## 2022-09-30 NOTE — PROGRESS NOTES
[facility] [address], [address] [facility], [address] [place] [address]  [phone]      Oncology Progress Note       Patient: Sergei Melo MRN: [id number]  SSN: [id number]    YOB: 1955  Age: 78 y.o. Sex: male      Subjective:      Sergei Melo is a 78 y.o. male who I am seeing for a diagnosis of high risk localized prostate carcinoma. He was noted to have an elevated PSA of 215 in May 2019 and was seen by [facility]. A biopsy of the prostate revealed 1/12 core with Curtis 4+3 and 11/12 cores with Alicia 3+4 prostate adenocarcinoma. He was referred to Dr. Liliya Rodriguez who in turn sent the patient to me to start Lupron. He feels well and denies bone pains or weight loss. He has completed EBRT to the prostate and is on ADT. He is doing well. PSA remains undetectable. He notes mild hot flashes but otherwise has no complaints. Review of Systems:    Constitutional: negative  Eyes: negative  Ears, Nose, Mouth, Throat, and Face: negative  Respiratory: negative  Cardiovascular: negative  Gastrointestinal: negative  Genitourinary:negative  Integument/Breast: negative  Hematologic/Lymphatic: negative  Musculoskeletal:negative  Neurological: negative    Review of systems was reviewed and updated as needed on 22. No past medical history on file. No past surgical history on file. Family History   Problem Relation Age of Onset    Heart Disease Brother         / had AICD     Social History     Tobacco Use    Smoking status: Every Day    Smokeless tobacco: Never    Tobacco comments:     1/2 pk day   Substance Use Topics    Alcohol use: Yes     Comment: beer/day - [place] [employer]      Prior to Admission medications    Medication Sig Start Date End Date Taking? Authorizing Provider   sildenafil citrate (VIAGRA) 100 mg tablet Take 100 mg by mouth as needed.  22 Yes Provider, Historical amLODIPine (NORVASC) 10 mg tablet Take  by mouth daily. Yes Provider, Historical   atorvastatin (LIPITOR) 10 mg tablet Take 10 mg by mouth daily. Patient not taking: No sig reported    Provider, Historical        No Known Allergies      Objective:     Vitals:    09/30/22 1025   BP: (!) 141/92   Pulse: 77   Resp: 16   Temp: 97.5 °F (36.4 °C)   SpO2: 100%   Weight: 190 lb (86.2 kg)   Height: 5' 9\" (1.753 m)      Pain Scale: 0 - No pain/10      Physical Exam:    GENERAL: alert, cooperative, no distress, appears stated age  EYE: conjunctivae/corneas clear. LYMPHATIC: Cervical, supraclavicular, and axillary nodes normal.   THROAT & NECK: normal and no erythema or exudates noted. LUNG: clear to auscultation bilaterally  HEART: regular rate and rhythm. ABDOMEN: soft, non-tender. EXTREMITIES:  extremities normal, atraumatic, no cyanosis or edema  SKIN: Normal.  NEUROLOGIC: AOx3. Gait normal.     The above physical exam was reviewed and updated as needed on 09/30/22. Lab Results   Component Value Date/Time    WBC 3.5 (L) 09/30/2022 10:03 AM    HGB 12.2 09/30/2022 10:03 AM    HCT 36.7 09/30/2022 10:03 AM    PLATELET 518 80/61/1837 10:03 AM    MCV 93.6 09/30/2022 10:03 AM     Lab Results   Component Value Date/Time    Prostate Specific Ag 0.0 (L) 07/08/2022 11:37 AM    Prostate Specific Ag 0.0 (L) 04/15/2022 10:12 AM    Prostate Specific Ag 0.0 (L) 12/15/2021 03:07 PM       Assessment:     1. Prostate carcinoma:       Non-metastatic      Asymptomatic   [place] 4+3   Pre-    ECOG PS 0  Intent of therapy - curative  Prognosis: excelent    High risk based on NCCN classification    Receiving Eligard  Received EBRT  PSA remains at 0  Doing well  Asymptomatic      Plan:     1. Continue ADT until April 2023  2.  Return in 3 months      Signed by: Nydia Patel NP                     September 30, 2022      I personally saw and evaluated the patient and performed the key components of medical decision making with Nelai Rojas NP. I reviewed and verified the above documentation and modified it as needed. Mr. Cristina Olivo is a gentleman with high risk prostate ca. He is on ADT and will continue today. I obtained history, performed physical exam, reviewed labs and imaging and communicated the treatment plan to the patient. Signed by: Shivam Benavides MD                     September 30, 2022          CC.  Alta Garcia MD

## 2022-09-30 NOTE — PROGRESS NOTES
[facility] Visit Note    1000- Pt arrived at [facility] ambulatory and in no distress for Eligard. Assessment completed, no new complaints voiced. Labs Obtained - CBC w/ diff, CMP, PSA, Testosterone  Recent Results (from the past 12 hour(s))   CBC WITH AUTOMATED DIFF    Collection Time: 09/30/22 10:03 AM   Result Value Ref Range    WBC 3.5 (L) 4.1 - 11.1 K/uL    RBC 3.92 (L) 4.10 - 5.70 M/uL    HGB 12.2 12.1 - 17.0 g/dL    HCT 36.7 36.6 - 50.3 %    MCV 93.6 80.0 - 99.0 FL    MCH 31.1 26.0 - 34.0 PG    MCHC 33.2 30.0 - 36.5 g/dL    RDW 14.6 (H) 11.5 - 14.5 %    PLATELET 928 675 - 023 K/uL    MPV 9.3 8.9 - 12.9 FL    NRBC 0.0 0  WBC    ABSOLUTE NRBC 0.00 0.00 - 0.01 K/uL    NEUTROPHILS 56 32 - 75 %    LYMPHOCYTES 26 12 - 49 %    MONOCYTES 15 (H) 5 - 13 %    EOSINOPHILS 2 0 - 7 %    BASOPHILS 1 0 - 1 %    IMMATURE GRANULOCYTES 0 0.0 - 0.5 %    ABS. NEUTROPHILS 2.0 1.8 - 8.0 K/UL    ABS. LYMPHOCYTES 0.9 0.8 - 3.5 K/UL    ABS. MONOCYTES 0.5 0.0 - 1.0 K/UL    ABS. EOSINOPHILS 0.1 0.0 - 0.4 K/UL    ABS. BASOPHILS 0.0 0.0 - 0.1 K/UL    ABS. IMM. GRANS. 0.0 0.00 - 0.04 K/UL    DF AUTOMATED     METABOLIC PANEL, COMPREHENSIVE    Collection Time: 09/30/22 10:03 AM   Result Value Ref Range    Sodium 137 136 - 145 mmol/L    Potassium 3.8 3.5 - 5.1 mmol/L    Chloride 105 97 - 108 mmol/L    CO2 28 21 - 32 mmol/L    Anion gap 4 (L) 5 - 15 mmol/L    Glucose 111 (H) 65 - 100 mg/dL    BUN 11 6 - 20 MG/DL    Creatinine 0.86 0.70 - 1.30 MG/DL    BUN/Creatinine ratio 13 12 - 20      GFR est AA >60 >60 ml/min/1.73m2    GFR est non-AA >60 >60 ml/min/1.73m2    Calcium 9.1 8.5 - 10.1 MG/DL    Bilirubin, total 0.6 0.2 - 1.0 MG/DL    ALT (SGPT) 52 12 - 78 U/L    AST (SGOT) 71 (H) 15 - 37 U/L    Alk.  phosphatase 127 (H) 45 - 117 U/L    Protein, total 8.1 6.4 - 8.2 g/dL    Albumin 4.0 3.5 - 5.0 g/dL    Globulin 4.1 (H) 2.0 - 4.0 g/dL    A-G Ratio 1.0 (L) 1.1 - 2.2       Visit Vitals  BP (!) 141/92   Pulse 77   Temp 97.5 °F (36.4 °C)   Resp 16   Wt 86.5 kg (190 lb 11.2 oz)   SpO2 100%   BMI 28.16 kg/m²       Medications received:  Medications Administered       leuprolide (ELIGARD 3 MONTH) injection 22.5 mg left side abd      Admin Date  09/30/2022 Action  Given Dose  22.5 mg Route  SubCUTAneous Administered By  Bryson Brooke, RN                  9891- Tolerated treatment well, no adverse reaction noted. D/Cd from [facility] ambulatory and in no distress accompanied by self.   Next appt 12/23/22

## 2022-09-30 NOTE — LETTER
9/30/2022    Patient: Albania Rock   YOB: 1955   Date of Visit: 9/30/2022     Mitch Olmstead MD  [id number] [place] [id number]  Via Fax: [phone]    Dear Mithc Olmstead MD,      Thank you for referring Mr. Srinivasa Lambert to [address] [place] [address] for evaluation. My notes for this consultation are attached. If you have questions, please do not hesitate to call me. I look forward to following your patient along with you.       Sincerely,    Mell Bonilla MD

## 2022-09-30 NOTE — PROGRESS NOTES
Nasreen Hernandez is a 78 y.o. male  Pt here today for prostate cancer follow up and treatment. Pt reports feeling really good today. Reports 0/10 pain,   Low libido,VS stable. Patient denies pain. Good appetite. Patient denies N/V/D and constipation. Patient denies numbness and tingling. Patient denies mouth ulcers. Patient denies cough. Patient denies SOB. Patient denies falls. Chief Complaint   Patient presents with    Follow-up     Prostate cancer     1. Have you been to the ER, urgent care clinic since your last visit? Hospitalized since your last visit? No    2. Have you seen or consulted any other health care providers outside of the [address] [place] [address] since your last visit? Include any pap smears or colon screening.  No

## 2022-10-02 LAB — TESTOST SERPL-MCNC: <3 NG/DL (ref 264–916)

## 2022-12-23 ENCOUNTER — HOSPITAL ENCOUNTER (OUTPATIENT)
Dept: INFUSION THERAPY | Age: 67
Discharge: HOME | End: 2022-12-23
Payer: MEDICARE

## 2022-12-23 VITALS
SYSTOLIC BLOOD PRESSURE: 131 MMHG | HEART RATE: 81 BPM | BODY MASS INDEX: 28.78 KG/M2 | WEIGHT: 194.9 LBS | RESPIRATION RATE: 16 BRPM | TEMPERATURE: 97.9 F | DIASTOLIC BLOOD PRESSURE: 82 MMHG | OXYGEN SATURATION: 98 %

## 2022-12-23 DIAGNOSIS — C61 PROSTATE CANCER (HCC): Primary | ICD-10-CM

## 2022-12-23 LAB
ALBUMIN SERPL-MCNC: 3.8 G/DL (ref 3.5–5)
ALBUMIN/GLOB SERPL: 1 {RATIO} (ref 1.1–2.2)
ALP SERPL-CCNC: 128 U/L (ref 45–117)
ALT SERPL-CCNC: 101 U/L (ref 12–78)
ANION GAP SERPL CALC-SCNC: 8 MMOL/L (ref 5–15)
AST SERPL-CCNC: 72 U/L (ref 15–37)
BASOPHILS # BLD: 0 K/UL (ref 0–0.1)
BASOPHILS NFR BLD: 1 % (ref 0–1)
BILIRUB SERPL-MCNC: 0.8 MG/DL (ref 0.2–1)
BUN SERPL-MCNC: 13 MG/DL (ref 6–20)
BUN/CREAT SERPL: 14 (ref 12–20)
CALCIUM SERPL-MCNC: 9 MG/DL (ref 8.5–10.1)
CHLORIDE SERPL-SCNC: 107 MMOL/L (ref 97–108)
CO2 SERPL-SCNC: 27 MMOL/L (ref 21–32)
CREAT SERPL-MCNC: 0.93 MG/DL (ref 0.7–1.3)
DIFFERENTIAL METHOD BLD: ABNORMAL
EOSINOPHIL # BLD: 0.1 K/UL (ref 0–0.4)
EOSINOPHIL NFR BLD: 2 % (ref 0–7)
ERYTHROCYTE [DISTWIDTH] IN BLOOD BY AUTOMATED COUNT: 14.2 % (ref 11.5–14.5)
GLOBULIN SER CALC-MCNC: 3.8 G/DL (ref 2–4)
GLUCOSE SERPL-MCNC: 105 MG/DL (ref 65–100)
HCT VFR BLD AUTO: 35.7 % (ref 36.6–50.3)
HGB BLD-MCNC: 11.7 G/DL (ref 12.1–17)
IMM GRANULOCYTES # BLD AUTO: 0 K/UL (ref 0–0.04)
IMM GRANULOCYTES NFR BLD AUTO: 0 % (ref 0–0.5)
LYMPHOCYTES # BLD: 1.2 K/UL (ref 0.8–3.5)
LYMPHOCYTES NFR BLD: 20 % (ref 12–49)
MCH RBC QN AUTO: 30.9 PG (ref 26–34)
MCHC RBC AUTO-ENTMCNC: 32.8 G/DL (ref 30–36.5)
MCV RBC AUTO: 94.2 FL (ref 80–99)
MONOCYTES # BLD: 0.8 K/UL (ref 0–1)
MONOCYTES NFR BLD: 14 % (ref 5–13)
NEUTS SEG # BLD: 3.7 K/UL (ref 1.8–8)
NEUTS SEG NFR BLD: 63 % (ref 32–75)
NRBC # BLD: 0 K/UL (ref 0–0.01)
NRBC BLD-RTO: 0 PER 100 WBC
PLATELET # BLD AUTO: 252 K/UL (ref 150–400)
PMV BLD AUTO: 9.4 FL (ref 8.9–12.9)
POTASSIUM SERPL-SCNC: 3.8 MMOL/L (ref 3.5–5.1)
PROT SERPL-MCNC: 7.6 G/DL (ref 6.4–8.2)
PSA SERPL-MCNC: 0 NG/ML (ref 0.01–4)
RBC # BLD AUTO: 3.79 M/UL (ref 4.1–5.7)
SODIUM SERPL-SCNC: 142 MMOL/L (ref 136–145)
WBC # BLD AUTO: 5.9 K/UL (ref 4.1–11.1)

## 2022-12-23 PROCEDURE — 74011250636 HC RX REV CODE- 250/636: Performed by: INTERNAL MEDICINE

## 2022-12-23 PROCEDURE — 36415 COLL VENOUS BLD VENIPUNCTURE: CPT

## 2022-12-23 PROCEDURE — 96402 CHEMO HORMON ANTINEOPL SQ/IM: CPT

## 2022-12-23 PROCEDURE — 84153 ASSAY OF PSA TOTAL: CPT

## 2022-12-23 PROCEDURE — 80053 COMPREHEN METABOLIC PANEL: CPT

## 2022-12-23 PROCEDURE — 84403 ASSAY OF TOTAL TESTOSTERONE: CPT

## 2022-12-23 PROCEDURE — 85025 COMPLETE CBC W/AUTO DIFF WBC: CPT

## 2022-12-23 RX ADMIN — LEUPROLIDE ACETATE 22.5 MG: KIT SUBCUTANEOUS at 10:56

## 2022-12-23 NOTE — PROGRESS NOTES
[facility] Visit Note    0999- Pt arrived at [facility] ambulatory and in no distress for Eligard. Assessment completed, no new complaints voiced. Labs Obtained - CBC w/ diff, CMP, PSA, Testosterone    Visit Vitals  /82   Pulse 81   Temp 97.9 °F (36.6 °C)   Resp 16   Wt 88.4 kg (194 lb 14.4 oz)   SpO2 98%   BMI 28.78 kg/m²       Medications received:  Medications Administered       leuprolide (ELIGARD 3 MONTH) injection 22.5 mg       Admin Date  12/23/2022 Action  Given Dose  22.5 mg Route  SubCUTAneous Administered By  Yenni Jiménez, RN                  1100- Tolerated treatment well, no adverse reaction noted. D/Cd from [facility] ambulatory and in no distress accompanied by self.   Next appt 3/17/23

## 2022-12-24 LAB — TESTOST SERPL-MCNC: <3 NG/DL (ref 264–916)

## 2023-03-17 ENCOUNTER — HOSPITAL ENCOUNTER (OUTPATIENT)
Dept: INFUSION THERAPY | Age: 68
End: 2023-03-17

## 2023-04-12 ENCOUNTER — HOSPITAL ENCOUNTER (OUTPATIENT)
Dept: INFUSION THERAPY | Age: 68
Discharge: HOME | End: 2023-04-12
Payer: MEDICARE

## 2023-04-12 VITALS
HEART RATE: 109 BPM | WEIGHT: 187.8 LBS | BODY MASS INDEX: 27.73 KG/M2 | SYSTOLIC BLOOD PRESSURE: 107 MMHG | OXYGEN SATURATION: 97 % | DIASTOLIC BLOOD PRESSURE: 73 MMHG | TEMPERATURE: 98.1 F | RESPIRATION RATE: 16 BRPM

## 2023-04-12 DIAGNOSIS — C61 PROSTATE CANCER (HCC): Primary | ICD-10-CM

## 2023-04-12 LAB
ALBUMIN SERPL-MCNC: 4 G/DL (ref 3.5–5)
ALBUMIN/GLOB SERPL: 1 (ref 1.1–2.2)
ALP SERPL-CCNC: 92 U/L (ref 45–117)
ALT SERPL-CCNC: 33 U/L (ref 12–78)
ANION GAP SERPL CALC-SCNC: 7 MMOL/L (ref 5–15)
AST SERPL-CCNC: 24 U/L (ref 15–37)
BASOPHILS # BLD: 0 K/UL (ref 0–0.1)
BASOPHILS NFR BLD: 1 % (ref 0–1)
BILIRUB SERPL-MCNC: 0.4 MG/DL (ref 0.2–1)
BUN SERPL-MCNC: 15 MG/DL (ref 6–20)
BUN/CREAT SERPL: 19 (ref 12–20)
CALCIUM SERPL-MCNC: 9.2 MG/DL (ref 8.5–10.1)
CHLORIDE SERPL-SCNC: 111 MMOL/L (ref 97–108)
CO2 SERPL-SCNC: 22 MMOL/L (ref 21–32)
CREAT SERPL-MCNC: 0.8 MG/DL (ref 0.7–1.3)
DIFFERENTIAL METHOD BLD: ABNORMAL
EOSINOPHIL # BLD: 0.1 K/UL (ref 0–0.4)
EOSINOPHIL NFR BLD: 2 % (ref 0–7)
ERYTHROCYTE [DISTWIDTH] IN BLOOD BY AUTOMATED COUNT: 14.8 % (ref 11.5–14.5)
GLOBULIN SER CALC-MCNC: 3.9 G/DL (ref 2–4)
GLUCOSE SERPL-MCNC: 99 MG/DL (ref 65–100)
HCT VFR BLD AUTO: 37.5 % (ref 36.6–50.3)
HGB BLD-MCNC: 12 G/DL (ref 12.1–17)
IMM GRANULOCYTES # BLD AUTO: 0 K/UL (ref 0–0.04)
IMM GRANULOCYTES NFR BLD AUTO: 0 % (ref 0–0.5)
LYMPHOCYTES # BLD: 0.9 K/UL (ref 0.8–3.5)
LYMPHOCYTES NFR BLD: 32 % (ref 12–49)
MCH RBC QN AUTO: 30.5 PG (ref 26–34)
MCHC RBC AUTO-ENTMCNC: 32 G/DL (ref 30–36.5)
MCV RBC AUTO: 95.2 FL (ref 80–99)
MONOCYTES # BLD: 0.5 K/UL (ref 0–1)
MONOCYTES NFR BLD: 17 % (ref 5–13)
NEUTS SEG # BLD: 1.4 K/UL (ref 1.8–8)
NEUTS SEG NFR BLD: 48 % (ref 32–75)
NRBC # BLD: 0 K/UL (ref 0–0.01)
NRBC BLD-RTO: 0 PER 100 WBC
PLATELET # BLD AUTO: 287 K/UL (ref 150–400)
PMV BLD AUTO: 9.6 FL (ref 8.9–12.9)
POTASSIUM SERPL-SCNC: 3.4 MMOL/L (ref 3.5–5.1)
PROT SERPL-MCNC: 7.9 G/DL (ref 6.4–8.2)
PSA SERPL-MCNC: 0 NG/ML (ref 0.01–4)
RBC # BLD AUTO: 3.94 M/UL (ref 4.1–5.7)
SODIUM SERPL-SCNC: 140 MMOL/L (ref 136–145)
WBC # BLD AUTO: 2.9 K/UL (ref 4.1–11.1)

## 2023-04-12 PROCEDURE — 96402 CHEMO HORMON ANTINEOPL SQ/IM: CPT

## 2023-04-12 PROCEDURE — 84153 ASSAY OF PSA TOTAL: CPT

## 2023-04-12 PROCEDURE — 96372 THER/PROPH/DIAG INJ SC/IM: CPT

## 2023-04-12 PROCEDURE — 84403 ASSAY OF TOTAL TESTOSTERONE: CPT

## 2023-04-12 PROCEDURE — 74011250636 HC RX REV CODE- 250/636: Performed by: INTERNAL MEDICINE

## 2023-04-12 PROCEDURE — 36415 COLL VENOUS BLD VENIPUNCTURE: CPT

## 2023-04-12 PROCEDURE — 85025 COMPLETE CBC W/AUTO DIFF WBC: CPT

## 2023-04-12 PROCEDURE — 80053 COMPREHEN METABOLIC PANEL: CPT

## 2023-04-12 RX ADMIN — LEUPROLIDE ACETATE 22.5 MG: KIT SUBCUTANEOUS at 14:03

## 2023-04-12 NOTE — PROGRESS NOTES
[facility] Visit Note    7997 Pt arrived at [facility] ambulatory and in no distress for Tasha. Assessment completed, no new complaints voiced. Labs drawn from [facility] See [facility] for results. Patient denied having any symptoms of COVID-19, i.e. SOB, coughing, fever, or generally not feeling well. Visit Vitals  /73   Pulse (!) 109   Temp 98.1 °F (36.7 °C)   Resp 16   Wt 85.2 kg (187 lb 12.8 oz)   SpO2 97%   BMI 27.73 kg/m²       Medications received:  Medications Administered       leuprolide (ELIGARD 3 MONTH) injection 22.5 mg       Admin Date  04/12/2023 Action  Given Dose  22.5 mg Route  SubCUTAneous Administered By  Denia Krishna, RN                      4099 Tolerated treatment well, no adverse reaction noted. D/Cd from [facility] ambulatory and in no distress accompanied by self. No further appointments scheduled at this time. Pt to MD office for scheduled appointment.

## 2023-04-14 LAB — TESTOST SERPL-MCNC: <3 NG/DL (ref 264–916)

## 2023-04-23 DIAGNOSIS — C61 PRIMARY PROSTATE ADENOCARCINOMA (HCC): Primary | ICD-10-CM

## 2023-05-25 RX ORDER — ATORVASTATIN CALCIUM 10 MG/1
10 TABLET, FILM COATED ORAL DAILY
COMMUNITY

## 2023-05-25 RX ORDER — SILDENAFIL 100 MG/1
100 TABLET, FILM COATED ORAL PRN
COMMUNITY
Start: 2022-08-22 | End: 2023-08-22

## 2023-05-25 RX ORDER — AMLODIPINE BESYLATE 10 MG/1
TABLET ORAL DAILY
COMMUNITY

## 2023-10-13 NOTE — PROGRESS NOTES
Liam Adams is a 78 y.o. male here for 6 month follow up for prostate carcinoma. Pt has been well. No concerns brought up. 1. Have you been to the ER, urgent care clinic since your last visit? Hospitalized since your last visit? no    2. Have you seen or consulted any other health care providers outside of the [address] [place] [address] since your last visit? Include any pap smears or colon screening.   Cyn Cronin

## 2023-10-18 ENCOUNTER — OFFICE VISIT (OUTPATIENT)
Age: 68
End: 2023-10-18
Payer: MEDICARE

## 2023-10-18 VITALS
WEIGHT: 177.8 LBS | SYSTOLIC BLOOD PRESSURE: 104 MMHG | OXYGEN SATURATION: 99 % | DIASTOLIC BLOOD PRESSURE: 66 MMHG | BODY MASS INDEX: 26.33 KG/M2 | HEART RATE: 78 BPM | HEIGHT: 69 IN | TEMPERATURE: 98 F

## 2023-10-18 DIAGNOSIS — C61 MALIGNANT NEOPLASM OF PROSTATE (HCC): Primary | ICD-10-CM

## 2023-10-18 PROCEDURE — 3017F COLORECTAL CA SCREEN DOC REV: CPT | Performed by: INTERNAL MEDICINE

## 2023-10-18 PROCEDURE — G8419 CALC BMI OUT NRM PARAM NOF/U: HCPCS | Performed by: INTERNAL MEDICINE

## 2023-10-18 PROCEDURE — 99213 OFFICE O/P EST LOW 20 MIN: CPT | Performed by: INTERNAL MEDICINE

## 2023-10-18 PROCEDURE — G8484 FLU IMMUNIZE NO ADMIN: HCPCS | Performed by: INTERNAL MEDICINE

## 2023-10-18 PROCEDURE — G8427 DOCREV CUR MEDS BY ELIG CLIN: HCPCS | Performed by: INTERNAL MEDICINE

## 2023-10-18 PROCEDURE — 1123F ACP DISCUSS/DSCN MKR DOCD: CPT | Performed by: INTERNAL MEDICINE

## 2023-10-18 PROCEDURE — 4004F PT TOBACCO SCREEN RCVD TLK: CPT | Performed by: INTERNAL MEDICINE

## 2023-10-18 NOTE — PROGRESS NOTES
[facility]   at [address], [facility], [address] [place] [address]   [phone]         Oncology Progress Note         Patient: Ham Hernandez  MRN: [id number]   SSN: [id number]    YOB: 1955           Subjective:         Ham Hernandez is a 78 y.o. male who I am seeing for a diagnosis of high risk localized prostate carcinoma. He was noted to have an elevated PSA of 215 in May 2019 and was seen by [facility]. A  biopsy of the prostate revealed 1/12 core with Leticia 4+3 and 11/12 cores with Leticia 3+4 prostate adenocarcinoma. He was referred to Dr. Funmilayo Matthew who in turn sent the patient to me to start Lupron. He feels well and denies bone pains or weight loss. He has completed EBRT to the prostate and is on ADT. He is doing well. PSA remains undetectable. Review of Systems:      Constitutional: negative   Eyes: negative   Ears, Nose, Mouth, Throat, and Face: negative   Respiratory: negative   Cardiovascular: negative   Gastrointestinal: negative   Genitourinary:negative   Integument/Breast: negative   Hematologic/Lymphatic: negative   Musculoskeletal:negative   Neurological: negative       No past medical history on file. No past surgical history on file.     Family History   Problem Relation Age of Onset    Heart Disease Brother         / had AICD       Social History     Socioeconomic History    Marital status: Legally    Tobacco Use    Smoking status: Every Day    Smokeless tobacco: Never   Substance and Sexual Activity    Alcohol use: Yes    Drug use: Never       Current Outpatient Medications   Medication Sig Dispense Refill    Sildenafil Citrate (VIAGRA PO) Take 100 mg by mouth      amLODIPine (NORVASC) 10 MG tablet Take by mouth daily      atorvastatin (LIPITOR) 10 MG tablet Take 1 tablet by mouth daily      sildenafil (VIAGRA) 100 MG tablet Take 1 tablet by mouth as

## 2024-04-16 NOTE — PROGRESS NOTES
Justin Garcia is a 67 y.o. male here for 6 month follow up for prostate carcinoma.  Pt states he has been well. No concerns brought up.    1. Have you been to the ER, urgent care clinic since your last visit?  Hospitalized since your last visit? no    2. Have you seen or consulted any other health care providers outside of the [facility] since your last visit?  Include any pap smears or colon screening. Yenny

## 2024-04-18 ENCOUNTER — OFFICE VISIT (OUTPATIENT)
Age: 69
End: 2024-04-18
Payer: MEDICARE

## 2024-04-18 VITALS
BODY MASS INDEX: 25.98 KG/M2 | SYSTOLIC BLOOD PRESSURE: 108 MMHG | DIASTOLIC BLOOD PRESSURE: 70 MMHG | OXYGEN SATURATION: 98 % | HEIGHT: 69 IN | HEART RATE: 82 BPM | WEIGHT: 175.4 LBS | TEMPERATURE: 98.2 F

## 2024-04-18 DIAGNOSIS — C61 MALIGNANT NEOPLASM OF PROSTATE (HCC): Primary | ICD-10-CM

## 2024-04-18 DIAGNOSIS — C61 MALIGNANT NEOPLASM OF PROSTATE (HCC): ICD-10-CM

## 2024-04-18 LAB
ALBUMIN SERPL-MCNC: 4 G/DL (ref 3.5–5)
ALBUMIN/GLOB SERPL: 1 (ref 1.1–2.2)
ALP SERPL-CCNC: 114 U/L (ref 45–117)
ALT SERPL-CCNC: 29 U/L (ref 12–78)
ANION GAP SERPL CALC-SCNC: 7 MMOL/L (ref 5–15)
AST SERPL-CCNC: 31 U/L (ref 15–37)
BASOPHILS # BLD: 0 K/UL (ref 0–0.1)
BASOPHILS NFR BLD: 1 % (ref 0–1)
BILIRUB SERPL-MCNC: 0.5 MG/DL (ref 0.2–1)
BUN SERPL-MCNC: 12 MG/DL (ref 6–20)
BUN/CREAT SERPL: 13 (ref 12–20)
CALCIUM SERPL-MCNC: 9.6 MG/DL (ref 8.5–10.1)
CHLORIDE SERPL-SCNC: 106 MMOL/L (ref 97–108)
CO2 SERPL-SCNC: 28 MMOL/L (ref 21–32)
CREAT SERPL-MCNC: 0.94 MG/DL (ref 0.7–1.3)
DIFFERENTIAL METHOD BLD: ABNORMAL
EOSINOPHIL # BLD: 0.1 K/UL (ref 0–0.4)
EOSINOPHIL NFR BLD: 2 % (ref 0–7)
ERYTHROCYTE [DISTWIDTH] IN BLOOD BY AUTOMATED COUNT: 15.4 % (ref 11.5–14.5)
GLOBULIN SER CALC-MCNC: 3.9 G/DL (ref 2–4)
GLUCOSE SERPL-MCNC: 114 MG/DL (ref 65–100)
HCT VFR BLD AUTO: 37.4 % (ref 36.6–50.3)
HGB BLD-MCNC: 12.4 G/DL (ref 12.1–17)
IMM GRANULOCYTES # BLD AUTO: 0 K/UL (ref 0–0.04)
IMM GRANULOCYTES NFR BLD AUTO: 1 % (ref 0–0.5)
LYMPHOCYTES # BLD: 1.1 K/UL (ref 0.8–3.5)
LYMPHOCYTES NFR BLD: 28 % (ref 12–49)
MCH RBC QN AUTO: 31.7 PG (ref 26–34)
MCHC RBC AUTO-ENTMCNC: 33.2 G/DL (ref 30–36.5)
MCV RBC AUTO: 95.7 FL (ref 80–99)
MONOCYTES # BLD: 0.6 K/UL (ref 0–1)
MONOCYTES NFR BLD: 17 % (ref 5–13)
NEUTS SEG # BLD: 1.9 K/UL (ref 1.8–8)
NEUTS SEG NFR BLD: 51 % (ref 32–75)
NRBC # BLD: 0 K/UL (ref 0–0.01)
NRBC BLD-RTO: 0 PER 100 WBC
PLATELET # BLD AUTO: 268 K/UL (ref 150–400)
PMV BLD AUTO: 9 FL (ref 8.9–12.9)
POTASSIUM SERPL-SCNC: 4.2 MMOL/L (ref 3.5–5.1)
PROT SERPL-MCNC: 7.9 G/DL (ref 6.4–8.2)
PSA SERPL-MCNC: 0 NG/ML (ref 0.01–4)
RBC # BLD AUTO: 3.91 M/UL (ref 4.1–5.7)
SODIUM SERPL-SCNC: 141 MMOL/L (ref 136–145)
WBC # BLD AUTO: 3.7 K/UL (ref 4.1–11.1)

## 2024-04-18 PROCEDURE — 99213 OFFICE O/P EST LOW 20 MIN: CPT | Performed by: INTERNAL MEDICINE

## 2024-04-18 NOTE — PROGRESS NOTES
[facility]   at [facility]   [address], [facility], [address]   [place] [address]   [phone]         Oncology Progress Note         Patient: Justin Garcia  MRN: [id number]   SSN: [id number]    YOB: 1955           Subjective:         Justin Garcia is a 67 y.o. male who I am seeing for a diagnosis of high risk localized prostate carcinoma. He was noted to have an elevated PSA of 215 in May 2019 and [facility]. A  biopsy of the prostate revealed 1/12 core with Leticia 4+3 and 11/12 cores with Leticia 3+4 prostate adenocarcinoma. He was referred to Dr. Abel who in turn sent the patient to me to start Lupron. He feels well and denies bone pains or weight loss.  He has completed EBRT to the prostate and is on ADT. He is doing well. PSA remains undetectable. He completed 2 yrs of ADT and is doing well.          Review of Systems:      Constitutional: negative   Eyes: negative   Ears, Nose, Mouth, Throat, and Face: negative   Respiratory: negative   Cardiovascular: negative   Gastrointestinal: negative   Genitourinary:negative   Integument/Breast: negative   Hematologic/Lymphatic: negative   Musculoskeletal:negative   Neurological: negative       No past medical history on file.    No past surgical history on file.    Family History   Problem Relation Age of Onset    Heart Disease Brother         / had AICD       Social History     Socioeconomic History    Marital status: Legally    Tobacco Use    Smoking status: Every Day    Smokeless tobacco: Never   Substance and Sexual Activity    Alcohol use: Yes    Drug use: Never       Current Outpatient Medications   Medication Sig Dispense Refill    Sildenafil Citrate (VIAGRA PO) Take 100 mg by mouth      amLODIPine (NORVASC) 10 MG tablet Take by mouth daily      atorvastatin (LIPITOR) 10 MG tablet Take 1 tablet by mouth daily (Patient not taking:

## 2024-04-20 LAB — TESTOST SERPL-MCNC: <3 NG/DL (ref 264–916)
